# Patient Record
Sex: MALE | Race: WHITE | NOT HISPANIC OR LATINO
[De-identification: names, ages, dates, MRNs, and addresses within clinical notes are randomized per-mention and may not be internally consistent; named-entity substitution may affect disease eponyms.]

---

## 2020-01-07 ENCOUNTER — APPOINTMENT (OUTPATIENT)
Dept: CARDIOLOGY | Facility: CLINIC | Age: 30
End: 2020-01-07
Payer: COMMERCIAL

## 2020-01-07 ENCOUNTER — RECORD ABSTRACTING (OUTPATIENT)
Age: 30
End: 2020-01-07

## 2020-01-07 ENCOUNTER — NON-APPOINTMENT (OUTPATIENT)
Age: 30
End: 2020-01-07

## 2020-01-07 VITALS
BODY MASS INDEX: 32.85 KG/M2 | HEIGHT: 74 IN | HEART RATE: 87 BPM | WEIGHT: 256 LBS | SYSTOLIC BLOOD PRESSURE: 160 MMHG | DIASTOLIC BLOOD PRESSURE: 100 MMHG | OXYGEN SATURATION: 96 %

## 2020-01-07 DIAGNOSIS — Z78.9 OTHER SPECIFIED HEALTH STATUS: ICD-10-CM

## 2020-01-07 DIAGNOSIS — Z87.891 PERSONAL HISTORY OF NICOTINE DEPENDENCE: ICD-10-CM

## 2020-01-07 PROCEDURE — 93000 ELECTROCARDIOGRAM COMPLETE: CPT

## 2020-01-07 PROCEDURE — 99204 OFFICE O/P NEW MOD 45 MIN: CPT

## 2020-01-07 NOTE — REASON FOR VISIT
[Consultation] : a consultation regarding [Abnormal ECG] : an abnormal ECG [Heart Failure] : congestive heart failure [Hypertension] : hypertension

## 2020-01-07 NOTE — ASSESSMENT
[FreeTextEntry1] : History of heart failure, CMP\par Renal insufficiency\par Sleep apnea\par Uncontrolled hypertension, HHD. \par \par Restart carvedilol, hydralazine.\par Take Imdur once daily\par \par Echo\par Rule out pulmonary hypertension.\par Monitor blood pressure at home. \par Low-sodium diet. \par Weight loss for long-term cardiovascular health. \par Treat sleep apnea. \par Labs requested. \par \par Return to office in one week for blood pressure check. \par If symptoms ER inpatient evaluation. \par Records have been requested from [facility] cardiology.

## 2020-01-07 NOTE — ADDENDUM
[FreeTextEntry1] : Prior echocardiogram demonstrated LV dysfunction which improved with blood pressure control. \par Prior cardiac MRI demonstrated normal left ventricular function with mid to basal hypertrophy. \par Exercise test was negative for ischemia. \par Last echocardiogram by report describes normal left ventricular function. \par Prior blood work 2015, creatinine 1.7\par 2014 2.9 \par

## 2020-01-07 NOTE — PHYSICAL EXAM
[General Appearance - Well Developed] : well developed [Normal Appearance] : normal appearance [Well Groomed] : well groomed [General Appearance - Well Nourished] : well nourished [No Deformities] : no deformities [General Appearance - In No Acute Distress] : no acute distress [Normal Conjunctiva] : the conjunctiva exhibited no abnormalities [Eyelids - No Xanthelasma] : the eyelids demonstrated no xanthelasmas [Normal Oral Mucosa] : normal oral mucosa [No Oral Pallor] : no oral pallor [No Oral Cyanosis] : no oral cyanosis [Normal Jugular Venous A Waves Present] : normal jugular venous A waves present [Normal Jugular Venous V Waves Present] : normal jugular venous V waves present [No Jugular Venous Matthew A Waves] : no jugular venous matthew A waves [Heart Rate And Rhythm] : heart rate and rhythm were normal [Heart Sounds] : normal S1 and S2 [Murmurs] : no murmurs present [Respiration, Rhythm And Depth] : normal respiratory rhythm and effort [Exaggerated Use Of Accessory Muscles For Inspiration] : no accessory muscle use [Auscultation Breath Sounds / Voice Sounds] : lungs were clear to auscultation bilaterally [Abdomen Soft] : soft [Abdomen Tenderness] : non-tender [Abdomen Mass (___ Cm)] : no abdominal mass palpated [Abnormal Walk] : normal gait [Gait - Sufficient For Exercise Testing] : the gait was sufficient for exercise testing [Nail Clubbing] : no clubbing of the fingernails [Cyanosis, Localized] : no localized cyanosis [Petechial Hemorrhages (___cm)] : no petechial hemorrhages [Skin Color & Pigmentation] : normal skin color and pigmentation [] : no rash [No Venous Stasis] : no venous stasis [Skin Lesions] : no skin lesions [No Skin Ulcers] : no skin ulcer [No Xanthoma] : no  xanthoma was observed [Oriented To Time, Place, And Person] : oriented to person, place, and time [Affect] : the affect was normal [Mood] : the mood was normal [No Anxiety] : not feeling anxious [FreeTextEntry1] : OVERWEIGHT

## 2020-01-07 NOTE — HISTORY OF PRESENT ILLNESS
[FreeTextEntry1] : KATINA PARHAM  is a 29 year old  M\par Initial cardiovascular evaluation. \par \par History of heart failure, CMP, CRI, JUAN, past tob use. \par In 2014. He presented to an urgent care with cough and uncontrolled blood pressure. \par Transferred to the hospital. He reports he was initially admitted to the ICU.\par There was LV and renal dysfunction.  Told heart is enlarged. 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 \par Followed at [facility] cardiology.  Started on medical therapy. \par Previously saw nephrology. \par There is no prior history of a clinical myocardial infarction, coronary revascularization. \par There is no history of rheumatic heart disease or valvular disease.\par There is no history of symptomatic arrhythmias including atrial fibrillation.\par \par Recently he has been less active and been out of his medications. \par Previously, he was prescribed carvedilol, enalapril, hydralazine, and isosorbide. \par He has had no recent blood work performed. \par There is no exertional chest pain, pressure or discomfort. \par There is no significant dyspnea on exertion or orthopnea. \par There are no symptomatic palpitations, lightheadedness, dizziness or syncope.\par \par EKG demonstrates normal sinus rhythm LVH with nonspecific ST changes\par

## 2020-01-17 ENCOUNTER — APPOINTMENT (OUTPATIENT)
Dept: CARDIOLOGY | Facility: CLINIC | Age: 30
End: 2020-01-17
Payer: COMMERCIAL

## 2020-01-17 VITALS
SYSTOLIC BLOOD PRESSURE: 140 MMHG | WEIGHT: 252 LBS | OXYGEN SATURATION: 97 % | HEIGHT: 74 IN | DIASTOLIC BLOOD PRESSURE: 70 MMHG | HEART RATE: 79 BPM | BODY MASS INDEX: 32.34 KG/M2

## 2020-01-17 PROCEDURE — 99214 OFFICE O/P EST MOD 30 MIN: CPT

## 2020-01-17 RX ORDER — ENALAPRIL MALEATE 10 MG/1
10 TABLET ORAL DAILY
Refills: 0 | Status: DISCONTINUED | COMMUNITY
End: 2020-01-17

## 2020-01-17 NOTE — PHYSICAL EXAM
[Normal Appearance] : normal appearance [General Appearance - Well Developed] : well developed [Well Groomed] : well groomed [General Appearance - Well Nourished] : well nourished [General Appearance - In No Acute Distress] : no acute distress [No Deformities] : no deformities [Normal Conjunctiva] : the conjunctiva exhibited no abnormalities [Eyelids - No Xanthelasma] : the eyelids demonstrated no xanthelasmas [Normal Oral Mucosa] : normal oral mucosa [No Oral Cyanosis] : no oral cyanosis [No Oral Pallor] : no oral pallor [Normal Jugular Venous V Waves Present] : normal jugular venous V waves present [Normal Jugular Venous A Waves Present] : normal jugular venous A waves present [No Jugular Venous Matthew A Waves] : no jugular venous matthew A waves [Respiration, Rhythm And Depth] : normal respiratory rhythm and effort [Exaggerated Use Of Accessory Muscles For Inspiration] : no accessory muscle use [Auscultation Breath Sounds / Voice Sounds] : lungs were clear to auscultation bilaterally [Heart Rate And Rhythm] : heart rate and rhythm were normal [Heart Sounds] : normal S1 and S2 [Murmurs] : no murmurs present [Abdomen Tenderness] : non-tender [Abdomen Soft] : soft [Abdomen Mass (___ Cm)] : no abdominal mass palpated [Abnormal Walk] : normal gait [Gait - Sufficient For Exercise Testing] : the gait was sufficient for exercise testing [Nail Clubbing] : no clubbing of the fingernails [Cyanosis, Localized] : no localized cyanosis [Petechial Hemorrhages (___cm)] : no petechial hemorrhages [Skin Color & Pigmentation] : normal skin color and pigmentation [] : no rash [No Venous Stasis] : no venous stasis [No Skin Ulcers] : no skin ulcer [Skin Lesions] : no skin lesions [No Xanthoma] : no  xanthoma was observed [Oriented To Time, Place, And Person] : oriented to person, place, and time [Affect] : the affect was normal [Mood] : the mood was normal [No Anxiety] : not feeling anxious

## 2020-01-17 NOTE — ASSESSMENT
[FreeTextEntry1] : 29 year old male with the above PMH and following active issues:\par \par HTN: BP elevated. Asymptomatic. Increase Coreg to 25 mg BID. Increase Hydralazine to 50 mg TID. Continue Isosorbide Mononitrite.\par \par Echocardiogram scheduled for Monday, 1/20/20 to evaluate heart structure and function. Patient will have BP check Monday. If BP remains elevated this weekend or if symptoms should occur, advised to call 911 or present to the nearest ED.\par \par CRI: Advised ongoing follow up with Nephrology.\par \par Elevated trigs and lipid profile. Low chol diet advised. Limit sugars, carbs, etoh. Would reasses in 6 weeks.\par \par Discussed exertional symptoms such as headache, visual changes, nausea, vomiting, decreased exercise tolerance, chest discomfort, or shortness of breath which would warrant sooner evaluation/further investigation.\par \par He will follow up Monday, 1/20/20.\par \par Sincerely,\par Val MATA-BC\par Patient's history, testing, and plan was reviewed with supervising physician, Dr. Brett Nieves

## 2020-01-17 NOTE — HISTORY OF PRESENT ILLNESS
[FreeTextEntry1] : Donis Murphy is a 29 year old male with a PMH of abnormal EKG, CHF, HTN, heart failure, cardiomyopathy, CRI, JUAN, and past tobacco use.]\par \par In 2014 he presented to an urgent care with cough and uncontrolled BP. He was transferred to the hospital and admitted to the ICU. There was LV and renal dysfunction. Told his heart was enlarged. 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 Followed at [facility], started on medical therapy. Previously saw nephrology. Recently he has been less active and been out of his medications. Previously, he was prescribed carvedilol, enalapril, hydralazine, and isosorbide.\par \par Last seen 1/7/20. In the interim there have been no hospitalizations or procedures.\par On 1/7/20, he was restarted on his medications (Coreg and Hydralazine). Labs and echo were ordered. He presents today for BP check. \par \par BP on my exam right arm 208/140, left arm 200/120. Completely asymptomatic. Denies headache, visual disturbances, nausea, and vomiting. He denies chest pain, pressure, palpitations, unusual shortness of breath, orthopnea, LE edema, lightheadedness, dizziness, near syncope or syncope.\par \par \par Testing:\par \par Labs 1/14/20: Hgb 17.6, Hct 50.1, platelets 232, BUN 24, Cr 1.49, K 3.9, AST 27, ALT 48, Chol 240, Trig 302, HDL 42, LDl 138, Hgb A1C 5.3, TSH 2.420, C-Reactive Protein, Cardiac 1.02, , Mag 2.1\par \par EKG 1/7/20: demonstrates normal sinus rhythm LVH with nonspecific ST changes\par \par Echocardiogram 2/11/16: Normal LVSF. Normal LV cavity size. Normal LV wall thickness. EF 55-60%.\par \par ETT 10/27/14: Negative exercise EKG. No arrhythmias during exercise. Hypertensive response to exercise. Excellent exercise capacity at 13.1 mets.\par \par Echo 7/16/14: Normal LV size. Overall EF 15-20%. Mild mitral insufficiency. Concentric LVH. Dilated RV and LA.\par \par

## 2020-01-20 ENCOUNTER — APPOINTMENT (OUTPATIENT)
Dept: CARDIOLOGY | Facility: CLINIC | Age: 30
End: 2020-01-20
Payer: COMMERCIAL

## 2020-01-20 VITALS
DIASTOLIC BLOOD PRESSURE: 110 MMHG | HEART RATE: 67 BPM | WEIGHT: 252 LBS | OXYGEN SATURATION: 96 % | HEIGHT: 74 IN | BODY MASS INDEX: 32.34 KG/M2 | SYSTOLIC BLOOD PRESSURE: 158 MMHG

## 2020-01-20 PROCEDURE — ZZZZZ: CPT

## 2020-01-20 PROCEDURE — 99214 OFFICE O/P EST MOD 30 MIN: CPT

## 2020-01-20 PROCEDURE — 93306 TTE W/DOPPLER COMPLETE: CPT

## 2020-01-20 RX ORDER — CARVEDILOL 12.5 MG/1
12.5 TABLET, FILM COATED ORAL
Qty: 180 | Refills: 3 | Status: DISCONTINUED | COMMUNITY
End: 2020-01-20

## 2020-01-20 RX ORDER — HYDRALAZINE HYDROCHLORIDE 25 MG/1
25 TABLET ORAL
Qty: 270 | Refills: 0 | Status: DISCONTINUED | COMMUNITY
End: 2020-01-20

## 2020-01-20 NOTE — ADDENDUM
[FreeTextEntry1] : Please note the patient was reviewed with the PA.\par I was physically present during the service of the patient\par I was directly involved in the management plan and recommendations of care provided to the patient. \par I personally reviewed the history and physical exam and plan as documented by the PA above.\par \par Jake Nieves DO, FACC, RPVI\par Cardiologist\par 01/20/2020\par \par

## 2020-01-20 NOTE — ASSESSMENT
[FreeTextEntry1] : 29 year old male with the above PMH and following active issues:\par \par HTN: BP elevated. Asymptomatic. Start Nifedipine ER 30mg daily. Hypertension work up to include: Serum aldosterone, 24 hour catecholamines (urine), serum AM cortison, 24 hour urine Cr, plasma matanephrine, urine metanephine, plasma renin activity, TSH, and Vanillylmandelic acid urine. Will obtain renal ultrasound to r/u renal artery stenosis. Low salt diet discussed.\par \par Patient will have BP check next week. If BP remains elevated this weekend or if symptoms should occur, advised to call 911 or present to the nearest ED.\par \par JUAN: On CPAP.\par \par CRI: Advised ongoing follow up with Nephrology.\par \par Elevated trigs and lipid profile. Low chol diet advised. Limit sugars, carbs, etoh. Would reasses in 6 weeks.\par \par Discussed exertional symptoms such as headache, visual changes, nausea, vomiting, decreased exercise tolerance, chest discomfort, or shortness of breath which would warrant sooner evaluation/further investigation.\par \par He will follow up next week for BP check.\par \par Sincerely,\par Val Antony FNP-BC\par Patient's history, testing, and plan was reviewed with supervising physician, Dr. Brett Nieves

## 2020-01-20 NOTE — PHYSICAL EXAM
[General Appearance - Well Developed] : well developed [Normal Appearance] : normal appearance [General Appearance - Well Nourished] : well nourished [Well Groomed] : well groomed [No Deformities] : no deformities [General Appearance - In No Acute Distress] : no acute distress [Normal Conjunctiva] : the conjunctiva exhibited no abnormalities [Eyelids - No Xanthelasma] : the eyelids demonstrated no xanthelasmas [No Oral Pallor] : no oral pallor [Normal Oral Mucosa] : normal oral mucosa [No Oral Cyanosis] : no oral cyanosis [Normal Jugular Venous A Waves Present] : normal jugular venous A waves present [Normal Jugular Venous V Waves Present] : normal jugular venous V waves present [No Jugular Venous Matthew A Waves] : no jugular venous matthew A waves [Respiration, Rhythm And Depth] : normal respiratory rhythm and effort [Exaggerated Use Of Accessory Muscles For Inspiration] : no accessory muscle use [Heart Rate And Rhythm] : heart rate and rhythm were normal [Auscultation Breath Sounds / Voice Sounds] : lungs were clear to auscultation bilaterally [Heart Sounds] : normal S1 and S2 [Murmurs] : no murmurs present [Abdomen Soft] : soft [Abdomen Tenderness] : non-tender [Abdomen Mass (___ Cm)] : no abdominal mass palpated [Abnormal Walk] : normal gait [Nail Clubbing] : no clubbing of the fingernails [Gait - Sufficient For Exercise Testing] : the gait was sufficient for exercise testing [Petechial Hemorrhages (___cm)] : no petechial hemorrhages [Cyanosis, Localized] : no localized cyanosis [] : no rash [No Venous Stasis] : no venous stasis [Skin Color & Pigmentation] : normal skin color and pigmentation [No Skin Ulcers] : no skin ulcer [Skin Lesions] : no skin lesions [No Xanthoma] : no  xanthoma was observed [Oriented To Time, Place, And Person] : oriented to person, place, and time [Affect] : the affect was normal [Mood] : the mood was normal [No Anxiety] : not feeling anxious

## 2020-01-20 NOTE — HISTORY OF PRESENT ILLNESS
[FreeTextEntry1] : Donis Murphy is a 29 year old male with a PMH of abnormal EKG, CHF, HTN, heart failure, cardiomyopathy, CRI, JUAN, and past tobacco use.\par \par In 2014 he presented to an urgent care with cough and uncontrolled BP. He was transferred to the hospital and admitted to the ICU. There was LV and renal dysfunction. Told his heart was enlarged. 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 Followed at [facility], started on medical therapy. Previously saw nephrology. Recently he has been less active and been out of his medications. Previously, he was prescribed carvedilol, enalapril, hydralazine, and isosorbide.\par \par Last seen 1/17/20. BP was elevated. His Coreg was increased to 25 mg BID and Hydralazine was increased to 50mg TID and he presents today for repeat BP eval. /120 on my exam. Asymptomatic.\par He denies chest pain, pressure, palpitations, unusual shortness of breath, orthopnea, LE edema, lightheadedness, dizziness, near syncope or syncope.\par \par \par  In the interim there have been no hospitalizations or procedures.\par \par \par Echocardiogram done today demonstrating mild LVH, full report to follow.\par \par Testing:\par \par Labs 1/14/20: Hgb 17.6, Hct 50.1, platelets 232, BUN 24, Cr 1.49, K 3.9, AST 27, ALT 48, Chol 240, Trig 302, HDL 42, LDl 138, Hgb A1C 5.3, TSH 2.420, C-Reactive Protein, Cardiac 1.02, , Mag 2.1\par \par EKG 1/7/20: demonstrates normal sinus rhythm LVH with nonspecific ST changes\par \par Echocardiogram 2/11/16: Normal LVSF. Normal LV cavity size. Normal LV wall thickness. EF 55-60%.\par \par ETT 10/27/14: Negative exercise EKG. No arrhythmias during exercise. Hypertensive response to exercise. Excellent exercise capacity at 13.1 mets.\par \par Echo 7/16/14: Normal LV size. Overall EF 15-20%. Mild mitral insufficiency. Concentric LVH. Dilated RV and LA.\par \par

## 2020-06-19 ENCOUNTER — APPOINTMENT (OUTPATIENT)
Dept: ULTRASOUND IMAGING | Facility: CLINIC | Age: 30
End: 2020-06-19
Payer: COMMERCIAL

## 2020-06-19 PROCEDURE — 93975 VASCULAR STUDY: CPT

## 2020-06-23 ENCOUNTER — APPOINTMENT (OUTPATIENT)
Dept: CARDIOLOGY | Facility: CLINIC | Age: 30
End: 2020-06-23
Payer: COMMERCIAL

## 2020-06-23 VITALS
HEART RATE: 83 BPM | SYSTOLIC BLOOD PRESSURE: 130 MMHG | DIASTOLIC BLOOD PRESSURE: 84 MMHG | HEIGHT: 74 IN | WEIGHT: 268 LBS | OXYGEN SATURATION: 97 % | BODY MASS INDEX: 34.39 KG/M2

## 2020-06-23 VITALS — DIASTOLIC BLOOD PRESSURE: 106 MMHG | SYSTOLIC BLOOD PRESSURE: 150 MMHG

## 2020-06-23 PROCEDURE — 99214 OFFICE O/P EST MOD 30 MIN: CPT

## 2020-06-23 NOTE — PHYSICAL EXAM
[General Appearance - Well Developed] : well developed [Normal Appearance] : normal appearance [Well Groomed] : well groomed [General Appearance - Well Nourished] : well nourished [No Deformities] : no deformities [General Appearance - In No Acute Distress] : no acute distress [Normal Jugular Venous A Waves Present] : normal jugular venous A waves present [Normal Jugular Venous V Waves Present] : normal jugular venous V waves present [No Jugular Venous Matthew A Waves] : no jugular venous matthew A waves [Exaggerated Use Of Accessory Muscles For Inspiration] : no accessory muscle use [Respiration, Rhythm And Depth] : normal respiratory rhythm and effort [Auscultation Breath Sounds / Voice Sounds] : lungs were clear to auscultation bilaterally [Murmurs] : no murmurs present [Heart Rate And Rhythm] : heart rate and rhythm were normal [Heart Sounds] : normal S1 and S2 [Abdomen Tenderness] : non-tender [Abdomen Soft] : soft [Abdomen Mass (___ Cm)] : no abdominal mass palpated [Abnormal Walk] : normal gait [Gait - Sufficient For Exercise Testing] : the gait was sufficient for exercise testing [Nail Clubbing] : no clubbing of the fingernails [Cyanosis, Localized] : no localized cyanosis [Petechial Hemorrhages (___cm)] : no petechial hemorrhages [] : no rash [Skin Color & Pigmentation] : normal skin color and pigmentation [No Venous Stasis] : no venous stasis [Skin Lesions] : no skin lesions [No Skin Ulcers] : no skin ulcer [No Xanthoma] : no  xanthoma was observed [Oriented To Time, Place, And Person] : oriented to person, place, and time [Affect] : the affect was normal [No Anxiety] : not feeling anxious [Mood] : the mood was normal [FreeTextEntry1] : grossly normal

## 2020-06-23 NOTE — ASSESSMENT
[FreeTextEntry1] : 29 year old male with the above PMH and following active issues:\par \par HTN: BP elevated. Asymptomatic. 2/2 HTN workup thus far has been reviewed. CKD likely contributing. Last creat 1.49. Start low dose lisinopril/hctz 10-12.5mg daily. Close monitoring of renal function. Continue hydralazine, imdur, coreg, and nifedipine at current dosing. Sequential iterative approach to BP control. Long term would prefer less frequent dosing to ensure compliance. Low salt diet discussed. Increase exercise regimen, weight loss. \par \par JUAN: On CPAP.\par \par CKD: Advised ongoing follow up with Nephrology.\par \par HLD: Repeat lipid panel\par \par Patient remains asymptomatic. No clinical evidence of HF. Surveillance monitoring of echocardiography and close clinical followup for BP control. In the future consider referral to HTN specialist in [facility]. \par \par Sincerely,\par \par EVIN Cisneros\par Patients history, testing, and plan reviewed with supervising MD: Dr. Ed Villafana

## 2020-06-23 NOTE — ADDENDUM
[FreeTextEntry1] : Please note the patient was seen and examined with NP Elsie Matta\par I was physically present during the service of the patient and personally examined the patient. \par I was directly involved in the management plan and recommendations of the care provided to the patient. \par I personally reviewed the history and physical examination as documented by the NP above.\par 06/23/2020\par

## 2020-07-15 RX ORDER — LISINOPRIL AND HYDROCHLOROTHIAZIDE TABLETS 10; 12.5 MG/1; MG/1
10-12.5 TABLET ORAL
Qty: 60 | Refills: 0 | Status: DISCONTINUED | COMMUNITY
Start: 2020-06-23 | End: 2020-07-15

## 2020-08-11 ENCOUNTER — APPOINTMENT (OUTPATIENT)
Dept: CARDIOLOGY | Facility: CLINIC | Age: 30
End: 2020-08-11
Payer: COMMERCIAL

## 2020-08-11 VITALS
BODY MASS INDEX: 35.68 KG/M2 | WEIGHT: 278 LBS | SYSTOLIC BLOOD PRESSURE: 132 MMHG | HEIGHT: 74 IN | OXYGEN SATURATION: 95 % | HEART RATE: 97 BPM | DIASTOLIC BLOOD PRESSURE: 78 MMHG

## 2020-08-11 VITALS — DIASTOLIC BLOOD PRESSURE: 88 MMHG | SYSTOLIC BLOOD PRESSURE: 130 MMHG

## 2020-08-11 PROCEDURE — 99214 OFFICE O/P EST MOD 30 MIN: CPT

## 2020-08-11 RX ORDER — NIFEDIPINE 30 MG/1
30 TABLET, EXTENDED RELEASE ORAL DAILY
Qty: 90 | Refills: 3 | Status: DISCONTINUED | COMMUNITY
Start: 2020-01-23 | End: 2020-08-11

## 2020-08-11 NOTE — HISTORY OF PRESENT ILLNESS
[FreeTextEntry1] : Donis Murphy is a 29 year old male with a PMH of abnormal EKG, CHF, HTN, heart failure, cardiomyopathy, CRI, JUAN, and past tobacco use.\par \par In 2014 he presented to an urgent care with cough and uncontrolled BP. He was transferred to the hospital and admitted to the ICU. There was LV and renal dysfunction. Told his heart was enlarged. 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 Initially followed at [facility], started on medical therapy. Previously saw nephrology. Previously, he was prescribed carvedilol, enalapril, hydralazine, and isosorbide.\par \par He is less active since pandemic. No longer attends gym but generally active at work. He denies chest pain, pressure, palpitations, unusual shortness of breath, orthopnea, LE edema, lightheadedness, dizziness, near syncope or syncope. In the interim there have been no hospitalizations or procedures.\par \par Pt continues to have elevated BPs. Prior visit was 150/100s. Given pt's creatinine improved, lisinopril/hctz was started. Subsequently, K mayte to 5.7, creat to 2.18 so med was stopped. Today on my exam BP is 130/94. \par \par Trigs were >500 and unable to calc LDL so lipitor increased to 40mg daily. \par \par Pt drinks alcohol only on the weekends and avoids salt. \par \par Echocardiogram demonstrating mild LVH, LVEF 55-60%, JEMMA of MV\par Labs 3/2020 renin, metanephrines, TSH WNL --- Called [facility] and 24h urine specimen unavailable?\par Renal US 6/2020 negative for [id number]\par \par Labs 1/14/20: Hgb 17.6, Hct 50.1, platelets 232, BUN 24, Cr 1.49, K 3.9, AST 27, ALT 48, Chol 240, Trig 302, HDL 42, LDl 138, Hgb A1C 5.3, TSH 2.420, C-Reactive Protein, Cardiac 1.02, , Mag 2.1\par \par EKG 1/7/20: demonstrates normal sinus rhythm LVH with nonspecific ST changes\par \par Echocardiogram 2/11/16: Normal LVSF. Normal LV cavity size. Normal LV wall thickness. EF 55-60%.\par \par ETT 10/27/14: Negative exercise EKG. No arrhythmias during exercise. Hypertensive response to exercise. Excellent exercise capacity at 13.1 mets.\par \par Echo 7/16/14: Normal LV size. Overall EF 15-20%. Mild mitral insufficiency. Concentric LVH. Dilated RV and LA.\par \par

## 2020-08-11 NOTE — ASSESSMENT
[FreeTextEntry1] : 29 year old male with the above PMH and following active issues:\par \par HTN: BP elevated. Asymptomatic. 2/2 HTN workup thus far has been reviewed. CKD likely contributing. Last creat 2.12 on ACE/diuretic. Would avoid nephrotoxic agents and recommend pt have nephrology referral asap. Close monitoring of renal function. Continue hydralazine, imdur, coreg at current dosing. Seeing as HR >90 and DBP remains elevated, pt would like benefit from diltiazem over nifedipine. Will stop nifedipine and start diltiazem 180mg daily. Sequential iterative approach to BP control. Long term would prefer less frequent dosing to ensure compliance. Low salt diet discussed. Increase exercise regimen, weight loss. \par \par JUAN: On CPAP.\par \par CKD: Advised ongoing follow up with Nephrology. Pt has OV with Dr. Feng late Sept. \par \par HLD: Repeat lipid panel pending after increase in lipitor. Note trigs > 500, counseled pt on reduction in ETOH/carbs/sugar. Pt given info for nutritionist as per his request. \par \par Patient remains asymptomatic. No clinical evidence of HF. Surveillance monitoring of echocardiography and close clinical followup for BP control. In the future consider referral to HTN specialist in [facility]. \par \par F/U in one month to monitor BP/HR. Pt will start home log and bring cuff to next visit. \par \par Sincerely,\par \par EVIN Cisneros\par Patients history, testing, and plan reviewed with supervising MD: Dr. Cole Vargas

## 2020-08-11 NOTE — PHYSICAL EXAM
[General Appearance - Well Developed] : well developed [Well Groomed] : well groomed [Normal Appearance] : normal appearance [General Appearance - Well Nourished] : well nourished [General Appearance - In No Acute Distress] : no acute distress [No Deformities] : no deformities [Normal Jugular Venous A Waves Present] : normal jugular venous A waves present [Normal Jugular Venous V Waves Present] : normal jugular venous V waves present [No Jugular Venous Matthew A Waves] : no jugular venous matthew A waves [Auscultation Breath Sounds / Voice Sounds] : lungs were clear to auscultation bilaterally [Exaggerated Use Of Accessory Muscles For Inspiration] : no accessory muscle use [Respiration, Rhythm And Depth] : normal respiratory rhythm and effort [Heart Sounds] : normal S1 and S2 [Heart Rate And Rhythm] : heart rate and rhythm were normal [Murmurs] : no murmurs present [Abdomen Tenderness] : non-tender [Abdomen Mass (___ Cm)] : no abdominal mass palpated [Abdomen Soft] : soft [Abnormal Walk] : normal gait [Gait - Sufficient For Exercise Testing] : the gait was sufficient for exercise testing [Nail Clubbing] : no clubbing of the fingernails [Petechial Hemorrhages (___cm)] : no petechial hemorrhages [Cyanosis, Localized] : no localized cyanosis [No Venous Stasis] : no venous stasis [] : no rash [Skin Color & Pigmentation] : normal skin color and pigmentation [No Skin Ulcers] : no skin ulcer [Skin Lesions] : no skin lesions [Affect] : the affect was normal [Oriented To Time, Place, And Person] : oriented to person, place, and time [No Xanthoma] : no  xanthoma was observed [No Anxiety] : not feeling anxious [Mood] : the mood was normal [FreeTextEntry1] : grossly normal

## 2020-09-15 ENCOUNTER — APPOINTMENT (OUTPATIENT)
Dept: CARDIOLOGY | Facility: CLINIC | Age: 30
End: 2020-09-15

## 2020-10-27 ENCOUNTER — APPOINTMENT (OUTPATIENT)
Dept: CARDIOLOGY | Facility: CLINIC | Age: 30
End: 2020-10-27

## 2020-12-01 ENCOUNTER — APPOINTMENT (OUTPATIENT)
Dept: CARDIOLOGY | Facility: CLINIC | Age: 30
End: 2020-12-01
Payer: COMMERCIAL

## 2020-12-01 ENCOUNTER — NON-APPOINTMENT (OUTPATIENT)
Age: 30
End: 2020-12-01

## 2020-12-01 VITALS
TEMPERATURE: 97.1 F | HEART RATE: 73 BPM | SYSTOLIC BLOOD PRESSURE: 162 MMHG | DIASTOLIC BLOOD PRESSURE: 102 MMHG | HEIGHT: 74 IN | OXYGEN SATURATION: 97 % | BODY MASS INDEX: 34.39 KG/M2 | WEIGHT: 268 LBS

## 2020-12-01 PROCEDURE — 99072 ADDL SUPL MATRL&STAF TM PHE: CPT

## 2020-12-01 PROCEDURE — 99214 OFFICE O/P EST MOD 30 MIN: CPT

## 2020-12-01 PROCEDURE — 93000 ELECTROCARDIOGRAM COMPLETE: CPT

## 2020-12-01 RX ORDER — ISOSORBIDE MONONITRATE 60 MG/1
60 TABLET, EXTENDED RELEASE ORAL DAILY
Qty: 90 | Refills: 3 | Status: DISCONTINUED | COMMUNITY
Start: 1900-01-01 | End: 2020-12-01

## 2020-12-01 NOTE — ADDENDUM
[FreeTextEntry1] : Please note the patient was seen and examined with NP Val Antony.\par I was physically present during the service of the patient and personally examined the patient. \par I was directly involved in the management plan and recommendations of the care provided to the patient. \par I personally reviewed the history and physical examination as documented by the NP above.\par 12/01/2020\par

## 2020-12-01 NOTE — PHYSICAL EXAM
[General Appearance - Well Developed] : well developed [Normal Appearance] : normal appearance [Well Groomed] : well groomed [General Appearance - Well Nourished] : well nourished [No Deformities] : no deformities [General Appearance - In No Acute Distress] : no acute distress [Normal Jugular Venous A Waves Present] : normal jugular venous A waves present [Normal Jugular Venous V Waves Present] : normal jugular venous V waves present [No Jugular Venous Matthew A Waves] : no jugular venous matthew A waves [Respiration, Rhythm And Depth] : normal respiratory rhythm and effort [Exaggerated Use Of Accessory Muscles For Inspiration] : no accessory muscle use [Auscultation Breath Sounds / Voice Sounds] : lungs were clear to auscultation bilaterally [Heart Rate And Rhythm] : heart rate and rhythm were normal [Heart Sounds] : normal S1 and S2 [Murmurs] : no murmurs present [Abdomen Soft] : soft [Abdomen Tenderness] : non-tender [Abdomen Mass (___ Cm)] : no abdominal mass palpated [Abnormal Walk] : normal gait [Gait - Sufficient For Exercise Testing] : the gait was sufficient for exercise testing [Nail Clubbing] : no clubbing of the fingernails [Cyanosis, Localized] : no localized cyanosis [Petechial Hemorrhages (___cm)] : no petechial hemorrhages [Skin Color & Pigmentation] : normal skin color and pigmentation [] : no rash [No Venous Stasis] : no venous stasis [Skin Lesions] : no skin lesions [No Skin Ulcers] : no skin ulcer [No Xanthoma] : no  xanthoma was observed [Oriented To Time, Place, And Person] : oriented to person, place, and time [Affect] : the affect was normal [Mood] : the mood was normal [No Anxiety] : not feeling anxious [FreeTextEntry1] : grossly normal

## 2020-12-01 NOTE — HISTORY OF PRESENT ILLNESS
[FreeTextEntry1] : Donis Murphy is a 30 year old male with a PMH of abnormal EKG, HTN, heart failure, cardiomyopathy, CRI, JUAN, and past tobacco use.\par \par In 2014 he presented to an urgent care with cough and uncontrolled BP. He was transferred to the hospital and admitted to the ICU. There was LV and renal dysfunction. Told his heart was enlarged. 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 Initially followed at [facility], started on medical therapy. Previously saw nephrology. Previously, he was prescribed carvedilol, enalapril, hydralazine, and isosorbide.\par \par Previously, given pt's creatinine improved, lisinopril/hctz was started. Subsequently, K mayte to 5.7, creat to 2.18 so med was stopped. Last seen 8/11/20. In the interim there have been no hospitalizations or procedures.  He has an upcoming apt to see nephrologist, Dr. Feng. He denies chest pain, pressure, palpitations, unusual shortness of breath, orthopnea, LE edema, lightheadedness, dizziness, near syncope or syncope. Exercising 6x a week without exertional complaints. Former smoker; quit ~4-5 years ago.\par \par /110 on my exam. Denies  headaches.\par \par Trigs were >500 and unable to calc LDL so lipitor increased to 40mg daily. Tolerating it well.\par \par Pt drinks alcohol only on the weekends and avoids salt. \par \par Testing:\par \par EKG 12/1/20: SR at 70 bpm, MI interval 144 ms, QTc 379 ms, nonspecific ST-T wave abnormalities \par \par Labs 11/25/20: K 3.9, Cr 1.48, Chol 139, HDL 32, LDL 38, Trigs 344, TSH 2.38, Hgb 17\par \par Echocardiogram demonstrating mild LVH, LVEF 55-60%, JEMMA of MV\par \par Labs 3/2020 renin, metanephrines, TSH WNL --- Called [facility] and 24h urine specimen unavailable?\par \par Renal US 6/2020 negative for СВЕТЛАНА\par \par Labs 1/14/20: Hgb 17.6, Hct 50.1, platelets 232, BUN 24, Cr 1.49, K 3.9, AST 27, ALT 48, Chol 240, Trig 302, HDL 42, LDl 138, Hgb A1C 5.3, TSH 2.420, C-Reactive Protein, Cardiac 1.02, , Mag 2.1\par \par EKG 1/7/20: demonstrates normal sinus rhythm LVH with nonspecific ST changes\par \par Echocardiogram 2/11/16: Normal LVSF. Normal LV cavity size. Normal LV wall thickness. EF 55-60%.\par \par ETT 10/27/14: Negative exercise EKG. No arrhythmias during exercise. Hypertensive response to exercise. Excellent exercise capacity at 13.1 mets.\par \par Echo 7/16/14: Normal LV size. Overall EF 15-20%. Mild mitral insufficiency. Concentric LVH. Dilated RV and LA.\par \par

## 2020-12-01 NOTE — ASSESSMENT
[FreeTextEntry1] : KATINA PARHAM is a 30 year old M who presents today Dec 01, 2020 with the above history and the following active issues:\par \par \par HTN: BP elevated. Asymptomatic. 2/2 HTN workup thus far has been reviewed. CKD likely contributing. Cr stable at 1.48 and BP uncontrolled. Start Losartan 25mg daily. BMP in 1-2 weeks. Close monitoring of renal function. Continue hydralazine, coreg, and Dilt at current dosing. Sequential iterative approach to BP control. Long term would prefer less frequent dosing to ensure compliance. Low salt diet discussed. Increase exercise regimen, weight loss. Referral to HTN specialist, Dr. Juan Martinez given to patient.\par \par JUAN: On CPAP.\par \par CKD: Advised ongoing follow up with Nephrology. Pt has OV with Dr. Feng late Sept. \par \par HLD: Chol better controlled on statin. Trigs still elevated. Decrease ETOH/carbs/sugar. \par \par Patient remains asymptomatic. No clinical evidence of HF. Surveillance monitoring of echocardiography and close clinical followup for BP control. Echo has been ordered.\par \par Ongoing f/u with PCP.\par \par F/U after testing to review results (echo, labs, BP check on Losartan).\par Discussed red flag symptoms, which would warrant sooner or emergent medical evaluation.\par Any questions and concerns were addressed and resolved.\par \par Sincerely,\par Val Antony FN-BC\par Patient's history, testing, and plan was reviewed with supervising physician, Dr. Ed Villafana

## 2020-12-18 ENCOUNTER — APPOINTMENT (OUTPATIENT)
Dept: CARDIOLOGY | Facility: CLINIC | Age: 30
End: 2020-12-18

## 2021-01-15 ENCOUNTER — APPOINTMENT (OUTPATIENT)
Dept: CARDIOLOGY | Facility: CLINIC | Age: 31
End: 2021-01-15
Payer: COMMERCIAL

## 2021-01-15 PROCEDURE — 93306 TTE W/DOPPLER COMPLETE: CPT

## 2021-01-15 PROCEDURE — 99072 ADDL SUPL MATRL&STAF TM PHE: CPT

## 2021-02-05 ENCOUNTER — APPOINTMENT (OUTPATIENT)
Dept: CARDIOLOGY | Facility: CLINIC | Age: 31
End: 2021-02-05

## 2021-02-15 ENCOUNTER — APPOINTMENT (OUTPATIENT)
Dept: CARDIOLOGY | Facility: CLINIC | Age: 31
End: 2021-02-15

## 2021-03-01 ENCOUNTER — RESULT CHARGE (OUTPATIENT)
Age: 31
End: 2021-03-01

## 2021-03-02 ENCOUNTER — APPOINTMENT (OUTPATIENT)
Dept: CARDIOLOGY | Facility: CLINIC | Age: 31
End: 2021-03-02
Payer: COMMERCIAL

## 2021-03-02 VITALS
BODY MASS INDEX: 34.65 KG/M2 | WEIGHT: 270 LBS | OXYGEN SATURATION: 96 % | SYSTOLIC BLOOD PRESSURE: 158 MMHG | DIASTOLIC BLOOD PRESSURE: 70 MMHG | HEART RATE: 77 BPM | HEIGHT: 74 IN

## 2021-03-02 PROCEDURE — 99072 ADDL SUPL MATRL&STAF TM PHE: CPT

## 2021-03-02 PROCEDURE — 99214 OFFICE O/P EST MOD 30 MIN: CPT

## 2021-03-02 NOTE — ASSESSMENT
[FreeTextEntry1] : KATINA PARHAM is a 30 year old M who presents today Mar 02, 2021 with the above history and the following active issues:\par \par \par HTN: BP elevated. Asymptomatic. 2/2 HTN workup thus far has been reviewed. CKD likely contributing. Cr stable at 1.68 and BP uncontrolled. Increase Losartan 25mg BID. Increase Diltiazem 180mg BID. Fasting b/w in 1 week and patient will come back to office in 2 weeks for BP check with home cuff. States BPs 140's-150's over 80's-90's at home. Close monitoring of renal function. Continue hydralazine and coreg at current dosing. Sequential iterative approach to BP control. Long term would prefer less frequent dosing to ensure compliance. Low salt diet discussed. Increase exercise regimen, weight loss. Referral to HTN specialist, Dr. Juan Martinez given to patient. He has apt 5/2021. Also has upcoming apt with renal, Dr. Feng.\par \par JUAN: On CPAP.\par \par CKD: Advised ongoing follow up with Nephrology. Pt has OV with Dr. Feng.\par \par HLD: Chol better controlled on statin. Trigs still elevated. Decrease ETOH/carbs/sugar. Fasting b/w ordered today.\par \par Patient remains asymptomatic. No clinical evidence of HF. Surveillance monitoring of echocardiography and close clinical followup for BP control.\par \par Ongoing f/u with PCP.\par \par F/U: Close clinical follow up. Patient to be seen in 2 weeks for BP check, review labs, and evaluate home cuff.\par Discussed red flag symptoms, which would warrant sooner or emergent medical evaluation.\par Any questions and concerns were addressed and resolved.\par \par Sincerely,\par Val Antony FNP-BC\par Patient's history, testing, and plan was reviewed with supervising physician, Dr. Ed Villafana

## 2021-03-02 NOTE — HISTORY OF PRESENT ILLNESS
[FreeTextEntry1] : Donis Murphy is a 30 year old male with a PMH of abnormal EKG, HTN, heart failure, cardiomyopathy, CRI, JUAN on CPAP, and past tobacco use.\par \par In 2014 he presented to an urgent care with cough and uncontrolled BP. He was transferred to the hospital and admitted to the ICU. There was LV and renal dysfunction. Told his heart was enlarged. 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 Initially followed at [facility], started on medical therapy. Previously saw nephrology. Previously, he was prescribed carvedilol, enalapril, hydralazine, and isosorbide.\par \par Previously, given pt's creatinine improved, lisinopril/hctz was started. Subsequently, K mayte to 5.7, creat to 2.18 so med was stopped. \par \par Last seen 12/1/20. Losartan was restarted. BMP ordered. Patient was also to see Dr. Feng (renal) and Dr. Juan Martinez (HTN specialist). He denies chest pain, pressure, palpitations, unusual shortness of breath, orthopnea, LE edema, lightheadedness, dizziness, near syncope or syncope. In the interim there have been no hospitalizations or procedures.  He has an upcoming apt to see nephrologist, Dr. Feng. Exercising 6x a week without exertional complaints. Former smoker; quit ~4-5 years ago.\par \par \par /118 on my exam. Asymptomatic.\par \par \par Testing:\par \par Labs 2/26/21: Gluc 120, Na 139, K 4, Cr 1.68, Ca 9.4\par \par Echo 1/15/21: EF 60%. Mild MR. Ascending aorta 3.8 cm (borderline dilation). Mild LVH. Normal wall motion. Minimal TR. Minimal [place]. Compared with echo 1/20/20, increase in LVH.\par \par EKG 12/1/20: SR at 70 bpm, OH interval 144 ms, QTc 379 ms, nonspecific ST-T wave abnormalities \par \par Labs 11/25/20: K 3.9, Cr 1.48, Chol 139, HDL 32, LDL 38, Trigs 344, TSH 2.38, Hgb 17\par \par Echocardiogram demonstrating mild LVH, LVEF 55-60%, JEMMA of MV\par \par Labs 3/2020 renin, metanephrines, TSH WNL --- Called [facility] and 24h urine specimen unavailable?\par \par Renal US 6/2020 negative for СВЕТЛАНА\par \par Labs 1/14/20: Hgb 17.6, Hct 50.1, platelets 232, BUN 24, Cr 1.49, K 3.9, AST 27, ALT 48, Chol 240, Trig 302, HDL 42, LDl 138, Hgb A1C 5.3, TSH 2.420, C-Reactive Protein, Cardiac 1.02, , Mag 2.1\par \par EKG 1/7/20: demonstrates normal sinus rhythm LVH with nonspecific ST changes\par \par Echocardiogram 2/11/16: Normal LVSF. Normal LV cavity size. Normal LV wall thickness. EF 55-60%.\par \par ETT 10/27/14: Negative exercise EKG. No arrhythmias during exercise. Hypertensive response to exercise. Excellent exercise capacity at 13.1 mets.\par \par Echo 7/16/14: Normal LV size. Overall EF 15-20%. Mild mitral insufficiency. Concentric LVH. Dilated RV and LA.\par \par

## 2021-03-02 NOTE — ADDENDUM
[FreeTextEntry1] : Please note the patient was seen and examined with NP aVl Antony.\maureen I was physically present during the service of the patient and personally examined the patient. \maureen I was directly involved in the management plan and recommendations of the care provided to the patient. \maureen I personally reviewed the history and physical examination as documented by the NP above.\par 03/02/2021\par

## 2021-03-10 ENCOUNTER — NON-APPOINTMENT (OUTPATIENT)
Age: 31
End: 2021-03-10

## 2021-03-16 ENCOUNTER — APPOINTMENT (OUTPATIENT)
Dept: CARDIOLOGY | Facility: CLINIC | Age: 31
End: 2021-03-16
Payer: COMMERCIAL

## 2021-03-16 VITALS
BODY MASS INDEX: 35.04 KG/M2 | WEIGHT: 273 LBS | OXYGEN SATURATION: 99 % | DIASTOLIC BLOOD PRESSURE: 74 MMHG | HEIGHT: 74 IN | SYSTOLIC BLOOD PRESSURE: 158 MMHG | HEART RATE: 80 BPM

## 2021-03-16 PROCEDURE — 99072 ADDL SUPL MATRL&STAF TM PHE: CPT

## 2021-03-16 PROCEDURE — 99214 OFFICE O/P EST MOD 30 MIN: CPT

## 2021-03-16 NOTE — ADDENDUM
[FreeTextEntry1] : Please note the patient was seen and examined with NP Val Antony.\maureen I was physically present during the service of the patient and personally examined the patient. \maureen I was directly involved in the management plan and recommendations of the care provided to the patient. \maureen I personally reviewed the history and physical examination as documented by the NP above.\par 03/16/2021\par

## 2021-03-16 NOTE — HISTORY OF PRESENT ILLNESS
[FreeTextEntry1] : Donis Murphy is a 30 year old male with a PMH of abnormal EKG, HTN, heart failure, cardiomyopathy, CRI, JUAN on CPAP, and past tobacco use.\par \par In 2014 he presented to an urgent care with cough and uncontrolled BP. He was transferred to the hospital and admitted to the ICU. There was LV and renal dysfunction. Told his heart was enlarged. 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 Initially followed at [facility], started on medical therapy. Previously saw nephrology. Previously, he was prescribed carvedilol, enalapril, hydralazine, and isosorbide.\par \par Previously, given pt's creatinine improved, lisinopril/hctz was started. Subsequently, K mayte to 5.7, creat to 2.18 so med was stopped. \par \par \par \par Last seen 3/2/21. Losartan 25mg BID was increased to BID. Dilt was increased to 100mg BID. Labs were ordered. See full list below. In the interim there have been no hospitalizations or procedures. He denies chest pain, pressure, palpitations, unusual shortness of breath, orthopnea, LE edema, lightheadedness, dizziness, near syncope or syncope. He has an upcoming apt to see nephrologist, Dr. Feng. Exercising 6x a week without exertional complaints. Former smoker; quit ~4-5 years ago.\par \par /120 in right arm, and 168/120 in left arm. Asymptomatic. States SBPs are 140's-150's at home.\par \par Of note, he received his Covid vaccine today.\par \par \par Testing:\par \par Labs 3/15/21: WBC 6.1, Hgb 15.9, HCT 45.5, plt 231, Gluc 106, Cr 1.74Na 140, K 4, Ca 9.6, AST 26, ALT 44, Chol 212, Trigs 583, HDL 31, LDL 86, A1C 5.4, TSH 2.080, CRP 0.81, BNP 13, , Mag 2.2. Patient states he had not exercised 1 week prior to lab draw.\par \par Labs 2/26/21: Gluc 120, Na 139, K 4, Cr 1.68, Ca 9.4\par \par Echo 1/15/21: EF 60%. Mild MR. Ascending aorta 3.8 cm (borderline dilation). Mild LVH. Normal wall motion. Minimal TR. Minimal [place]. Compared with echo 1/20/20, increase in LVH.\par \par EKG 12/1/20: SR at 70 bpm, UT interval 144 ms, QTc 379 ms, nonspecific ST-T wave abnormalities \par \par Labs 11/25/20: K 3.9, Cr 1.48, Chol 139, HDL 32, LDL 38, Trigs 344, TSH 2.38, Hgb 17\par \par Echocardiogram demonstrating mild LVH, LVEF 55-60%, JEMMA of MV\par \par Labs 3/2020 renin, metanephrines, TSH WNL --- Called [facility] and 24h urine specimen unavailable?\par \par Renal US 6/2020 negative for СВЕТЛАНА\par \par Labs 1/14/20: Hgb 17.6, Hct 50.1, platelets 232, BUN 24, Cr 1.49, K 3.9, AST 27, ALT 48, Chol 240, Trig 302, HDL 42, LDl 138, Hgb A1C 5.3, TSH 2.420, C-Reactive Protein, Cardiac 1.02, , Mag 2.1\par \par EKG 1/7/20: demonstrates normal sinus rhythm LVH with nonspecific ST changes\par \par Echocardiogram 2/11/16: Normal LVSF. Normal LV cavity size. Normal LV wall thickness. EF 55-60%.\par \par ETT 10/27/14: Negative exercise EKG. No arrhythmias during exercise. Hypertensive response to exercise. Excellent exercise capacity at 13.1 mets.\par \par Echo 7/16/14: Normal LV size. Overall EF 15-20%. Mild mitral insufficiency. Concentric LVH. Dilated RV and LA.\par \par

## 2021-03-16 NOTE — ASSESSMENT
[FreeTextEntry1] : KATINA PARHAM is a 30 year old M who presents today Mar 16, 2021 with the above history and the following active issues:\par \par HTN: BP elevated. Asymptomatic. 2/2 HTN workup thus far has been reviewed. CKD likely contributing. Cr stable at 1.74 and BP uncontrolled. Continue Losartan 25mg BID. Continue Diltiazem 180mg BID. States BPs 140's-150's over 80's-90's at home. Close monitoring of renal function. Continue hydralazine and coreg at current dosing. Start Spironolactone 12.5 mg daily. Fasting b/w in 1 week and patient will come back to office in 2 weeks for BP check with home cuff. Sequential iterative approach to BP control. Long term would prefer less frequent dosing to ensure compliance. Low salt diet discussed. Increase exercise regimen, weight loss. Referral to HTN specialist, Dr. Juan Martinez given to patient. He has apt 5/2021. Also has upcoming apt with renal, Dr. Feng.\par \par JUAN: On CPAP.\par \par CKD: Advised ongoing follow up with Nephrology. Pt has upcoming. OV with Dr. Feng.\par \par HLD: Chol better controlled on statin. Trigs still elevated. Decrease ETOH/carbs/sugar. Lovaza started. CK elevated, asymptomatic. Will recheck with next set of labs in 1 week. Advised to not exercise 48 hours prior.\par \par Patient remains asymptomatic. No clinical evidence of HF. Surveillance monitoring of echocardiography and close clinical followup for BP control.\par \par Ongoing f/u with PCP.\par \par F/U: Close clinical follow up. Patient to be seen in 2 weeks for BP check, review labs, and evaluate home cuff.\par Discussed red flag symptoms, which would warrant sooner or emergent medical evaluation.\par Any questions and concerns were addressed and resolved.\par \par Sincerely,\par Val Antony [facility]-BC\par Patient's history, testing, and plan was reviewed with supervising physician, Dr. Ed Villafana

## 2021-04-12 ENCOUNTER — APPOINTMENT (OUTPATIENT)
Dept: CARDIOLOGY | Facility: CLINIC | Age: 31
End: 2021-04-12
Payer: COMMERCIAL

## 2021-04-12 VITALS
TEMPERATURE: 96.8 F | WEIGHT: 284 LBS | OXYGEN SATURATION: 96 % | HEIGHT: 74 IN | DIASTOLIC BLOOD PRESSURE: 62 MMHG | BODY MASS INDEX: 36.45 KG/M2 | HEART RATE: 83 BPM | SYSTOLIC BLOOD PRESSURE: 124 MMHG

## 2021-04-12 PROCEDURE — 99072 ADDL SUPL MATRL&STAF TM PHE: CPT

## 2021-04-12 PROCEDURE — 99214 OFFICE O/P EST MOD 30 MIN: CPT

## 2021-04-12 NOTE — ASSESSMENT
[FreeTextEntry1] : KATINA PARHAM is a 30 year old M who presents today Apr 12, 2021 with the above history and the following active issues:\par \par \par HTN: BP elevated. Asymptomatic. 2/2 HTN workup thus far has been reviewed. CKD likely contributing. Cr stable at 1.47. Continue Losartan 25mg BID. Continue Diltiazem 180mg BID. Continue hydralazine and coreg at current dosing. Continue Spironolactone 12.5 mg daily. Close monitoring of renal function.  Sequential iterative approach to BP control. Long term would prefer less frequent dosing to ensure compliance. Low salt diet discussed. Increase exercise regimen, weight loss. Referral to HTN specialist, Dr. Juan Martinez given to patient. He has apt 5/2021. Also has upcoming apt with renal, Dr. Feng.\par \par JUAN: On CPAP.\par \par CKD: Advised ongoing follow up with Nephrology. Pt has upcoming. OV with Dr. Feng.\par \par HLD: Chol better controlled on statin. Trigs still elevated. Decrease ETOH/carbs/sugar. Continue Lovaza.\par \par CRP elevated. Referral to rheumatologist has been given.\par \par Patient remains asymptomatic. No clinical evidence of HF. Surveillance monitoring of echocardiography and close clinical followup for BP control.\par \par Ongoing f/u with PCP.\par \par F/U in 2 months with Dr. Villafana.\par Discussed red flag symptoms, which would warrant sooner or emergent medical evaluation.\par Any questions and concerns were addressed and resolved.\par \par Sincerely,\par Val Antony [facility]-BC\par Patient's history, testing, and plan was reviewed with supervising physician, Dr. Jake Nieves

## 2021-04-12 NOTE — HISTORY OF PRESENT ILLNESS
[FreeTextEntry1] : Donis Murpyh is a 30 year old male with a PMH of abnormal EKG, HTN, heart failure, cardiomyopathy, CRI, JUAN on CPAP, and past tobacco use.\par \par In 2014 he presented to an urgent care with cough and uncontrolled BP. He was transferred to the hospital and admitted to the ICU. There was LV and renal dysfunction. Told his heart was enlarged. 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 Initially followed at [facility], started on medical therapy. Previously saw nephrology. Previously, he was prescribed carvedilol, enalapril, hydralazine, and isosorbide.\par \par Previously, given pt's creatinine improved, lisinopril/hctz was started. Subsequently, K mayte to 5.7, creat to 2.18 so med was stopped. \par \par \par Last seen 3/16/21. BP was elevated. Spironolactone 12.5mg daily started. He was also advised to STOP a work out supplement he was taking from [employer]. Labs ordered. Presents today, 4/12/21, for BP check. BP is well controlled today. 120/70 on my exam. In the interim there have been no hospitalizations or procedures. He denies chest pain, pressure, palpitations, unusual shortness of breath, orthopnea, LE edema, lightheadedness, dizziness, near syncope or syncope. Exercising 6x a week without exertional complaints. Former smoker; quit ~4-5 years ago.\par \par He has an upcoming apt to see nephrologist, Dr. Feng. He also has an apt with HTN specialist Dr. Juan Martinez in May.\par \par He has received both Covid vaccines.\par \par \par Testing:\par \par Labs 4/9/21: WBC 6.1, Hgb 15.8, HCT 45.8, plt 209, Gluv 82, BUN 22, Cr 1.47, Na 141, k 4.3, Ca 9.7, AST 33, ALT 66, Chol 231, Trigs 470, HDL 37, , A1C 5.6, TSH 1.840, CRP 7.68, BNP 15, , Mag 2.2.\par \par Labs 3/15/21: WBC 6.1, Hgb 15.9, HCT 45.5, plt 231, Gluc 106, Cr 1.74Na 140, K 4, Ca 9.6, AST 26, ALT 44, Chol 212, Trigs 583, HDL 31, LDL 86, A1C 5.4, TSH 2.080, CRP 0.81, BNP 13, , Mag 2.2. Patient states he had not exercised 1 week prior to lab draw.\par \par Labs 2/26/21: Gluc 120, Na 139, K 4, Cr 1.68, Ca 9.4\par \par Echo 1/15/21: EF 60%. Mild MR. Ascending aorta 3.8 cm (borderline dilation). Mild LVH. Normal wall motion. Minimal TR. Minimal [place]. Compared with echo 1/20/20, increase in LVH.\par \par EKG 12/1/20: SR at 70 bpm, [place] interval 144 ms, QTc 379 ms, nonspecific ST-T wave abnormalities \par \par Labs 11/25/20: K 3.9, Cr 1.48, Chol 139, HDL 32, LDL 38, Trigs 344, TSH 2.38, Hgb 17\par \par Echocardiogram demonstrating mild LVH, LVEF 55-60%, JEMMA of MV\par \par Labs 3/2020 renin, metanephrines, TSH WNL --- Called [facility] and 24h urine specimen unavailable?\par \par Renal US 6/2020 negative for СВЕТЛАНА\par \par Labs 1/14/20: Hgb 17.6, Hct 50.1, platelets 232, BUN 24, Cr 1.49, K 3.9, AST 27, ALT 48, Chol 240, Trig 302, HDL 42, LDl 138, Hgb A1C 5.3, TSH 2.420, C-Reactive Protein, Cardiac 1.02, , Mag 2.1\par \par EKG 1/7/20: demonstrates normal sinus rhythm LVH with nonspecific ST changes\par \par Echocardiogram 2/11/16: Normal LVSF. Normal LV cavity size. Normal LV wall thickness. EF 55-60%.\par \par ETT 10/27/14: Negative exercise EKG. No arrhythmias during exercise. Hypertensive response to exercise. Excellent exercise capacity at 13.1 mets.\par \par Echo 7/16/14: Normal LV size. Overall EF 15-20%. Mild mitral insufficiency. Concentric LVH. Dilated RV and LA.\par \par

## 2021-05-14 ENCOUNTER — APPOINTMENT (OUTPATIENT)
Dept: CARDIOLOGY | Facility: CLINIC | Age: 31
End: 2021-05-14
Payer: COMMERCIAL

## 2021-05-14 ENCOUNTER — RESULT CHARGE (OUTPATIENT)
Age: 31
End: 2021-05-14

## 2021-05-14 VITALS
HEART RATE: 76 BPM | OXYGEN SATURATION: 98 % | WEIGHT: 275 LBS | BODY MASS INDEX: 35.29 KG/M2 | DIASTOLIC BLOOD PRESSURE: 35 MMHG | SYSTOLIC BLOOD PRESSURE: 142 MMHG | RESPIRATION RATE: 12 BRPM | HEIGHT: 74 IN | TEMPERATURE: 97.2 F

## 2021-05-14 DIAGNOSIS — Z83.2 FAMILY HISTORY OF DISEASES OF THE BLOOD AND BLOOD-FORMING ORGANS AND CERTAIN DISORDERS INVOLVING THE IMMUNE MECHANISM: ICD-10-CM

## 2021-05-14 DIAGNOSIS — E78.1 PURE HYPERGLYCERIDEMIA: ICD-10-CM

## 2021-05-14 PROCEDURE — 99215 OFFICE O/P EST HI 40 MIN: CPT

## 2021-05-14 PROCEDURE — 99072 ADDL SUPL MATRL&STAF TM PHE: CPT

## 2021-05-17 PROBLEM — E78.1 HYPERTRIGLYCERIDEMIA: Noted: 2020-08-11

## 2021-05-17 PROBLEM — Z83.2 FAMILY HISTORY OF BLOOD DYSCRASIA: Status: ACTIVE | Noted: 2021-05-17

## 2021-05-17 NOTE — CARDIOLOGY SUMMARY
[de-identified] : 12/01/2020, normal sinus rhythm at 70 bpm. There was voltage for LVH (Rayshawn criteria) and nonspecific ST depression, abnormal but nondiagnostic for this age.  [de-identified] : 10/27/14, Negative exercise EKG. No arrhythmias during exercise. Hypertensive response to exercise. Excellent exercise capacity at 13.1 mets. [de-identified] : 1/15/21: Echo 1/15/21: EF 60%. Mild MR. Ascending aorta 3.8 cm (borderline dilation). Mild LVH. Normal wall motion. Minimal TR. Minimal [place]. Compared with echo 1/20/20, increase in LVH.

## 2021-05-17 NOTE — REVIEW OF SYSTEMS
[Weight Gain (___ Lbs)] : [unfilled] ~Ulb weight gain [Negative] : Heme/Lymph [Headache] : no headache [Feeling Fatigued] : not feeling fatigued [SOB] : no shortness of breath [Dyspnea on exertion] : not dyspnea during exertion [Chest Discomfort] : no chest discomfort [Cough] : no cough

## 2021-05-17 NOTE — DISCUSSION/SUMMARY
[Hypertension] : hypertension [Stage II] : Stage II [NYHA Class I] : NYHA Class I [Essential Hypertension] : essential hypertension [Renal Parenchymal Disease] : renal parenchymal disease [Hypertensive Cardiomyopathy] : hypertensive cardiomyopathy [Diastolic Heart Failure] : diastolic heart failure [Sleep Apnea] : sleep apnea [Continue] : continuing beta blockers [Increase] : increasing aldosterone antagonists [Decrease] : decreasing vasodilators [Hypercholesterolemia] : hypercholesterolemia [Hypertriglyceridemia] : essential hypertriglyceridemia [Stable] : stable [Responding to Treatment] : responding to treatment [Diet Modification] : diet modification [Exercise] : exercise [<130] : goal is <130 [<150] : goal is <150 [>40] : goal is >40 [Exercise Regimen] : an exercise regimen [Dietary Modification] : dietary modification [Weight Loss] : weight loss [ETOH Moderation] : alcohol moderation [Low Sodium Diet] : low sodium diet [NSAIDs Avoidance] : non-steroidal anti-inflammatory drugs avoidance [Patient] : the patient [At Goal] : The HDL is not at goal [de-identified] : low HDL-C [de-identified] : lipids 4/9/21 were not at goal [de-identified] : maintain atorvastatin 40 mg daily at bedtime. Maintain omega-3 ethyl esters 1 GM capsule, 2 capsules twice daily (or 4 capsules daily) [de-identified] : reduction in second hand smoke exposure [de-identified] : 112 mg/dL on 4/9/21 [FreeTextEntry5] : 470 mg/dL on 4/9/12 (goal is for fasting specimen) [de-identified] : 37 mg/dL on 4/9/21 [de-identified] : Total CK was 334 U/L on 4/9/21 (normal 39 - 439) [de-identified] : overall BP was close to goal. Initial office BP, obtained by physician, was 142/95 mm Hg. Fifteen serial automated (Dinamap ™) hemodynamic measurements were obtained. Mean BP for all automated measurements was 136/82 ±4.7/5.9 mm Hg with mean HR 74 ± 5.9 bpm and mean oxygen saturation by pulse oximetry 98 ± 0.5% on room air. The lowest seated BP was 131/84 mm Hg. The last seated BP was 137/74 mm Hg with HR 72 bpm. Upon standing, BP was 129/75 mm Hg with HR 79 bpm. The last standing BP was 141/80 mm Hg with HR 82 bpm. The patient did not experience lightheadedness or palpitations upon or while standing. Out of all 15 readings, 27% of systolic readings was >= 140 mm Hg and 13% diastolic readings was >= 90 mm Hg. Serum creatinine was 1.47 mg/dL on 4/9/21 (normal 0.5 – 1.3). Serum potassium was 4.3 mmol/L on 4/9/21 (normal 3.5 – 5.3). Echocardiogram 1/15/21 noted mild concentric LVH with normal wall motion; LVEF 60%. The ascending aorta was borderline dilated at 3.8 cm. Secondary causes of severe and resistant hypertension include significant prior alcohol consumption, renal parenchymal hypertension and JUAN. Sleep study 5/13/15 showed severe JUAN with lowest SpO2 75%; apnea - hypopnea index was 48/hr and respiratory disturbance index 57/hr. He was fitted for CPAP. Of note, there was significant clinical response of BP with the addition of spironolactone to his regimen.  Labs 3/11/2020 noted PRA 5.265 ng/mL/hr, serum aldosterone 20.2 ng/dL, free plasma metanephrine 27 pg/mL, free plasma normetanephrine 62 pg/mL, TSH 2.01 uIU/mL (all within normal range). Renal US 6/19/2020 showed no renal artery stenosis. [de-identified] : decrease hydralazine from 50 mg 3X daily to 50 mg twice daily. discontinue diltiazem and substitute amlodipine (Norvasc) 5 mg daily. Maintain carvedilol (Coreg) 25 mg twice daily. Maintain losartan 25 mg twice daily. Increase spironolactone from 12.5 to 25 mg daily.  [de-identified] : After two - four weeks on current regimen, labs will be needed to assess serum creatinine and watch for hyperkalemia.  Labs were ordered for metabolic profile, serum uric acid, urinalysis, urine microalbumin / creatinine, spot urine sodium and spot urine urea. Of note, in one small randomized clinical trial, spironolactone reduced the severity of JUAN and reduced BP in individuals with resistant hypertension and with moderate-to-severe JUAN. [FreeTextEntry1] : \par WEIGHT GOALS:\par \par Category				BMI range - kg/m2	BMI Prime\par Very severely underweight		less than 15		less than 0.60	\par Severely underweight			from 15.0 to 16.0		from 0.60 to 0.64	\par Underweight				from 16.0 to 18.5		from 0.64 to 0.74	\par Normal (healthy weight)			from 18.5 to 25		from 0.74 to 1.0	\par Overweight				from 25 to 30		from 1.0 to 1.2	\par Obese Class I (Moderately obese)		from 30 to 35		from 1.2 to 1.4	\par Obese Class II (Severely obese)		from 35 to 40		from 1.4 to 1.6	\par Obese Class III (Very severely obese)	over 40			over 1.6	\par \par Your current weight is 275 lbs. Given current weight and height 6'2", your calculated body mass index (BMI) is 35.5 kg/sqm. Normal BMI is 18.5-25 kg/sqm. Thus current weight is in the obese class II category. Abdominal waist circumference is measured at the level of the umbilicus and is thus not a pants waist measurement. Your current abdominal waist circumference is 47.5 inches. Normal abdominal waist circumference is < 32 inches for women and < 37 inches for men.\par \par DIETARY SALT (SODIUM); DASH DIET AND BLOOD PRESSURE:\par To decrease the sodium in your diet: \par · Use fresh vegetables and foods as much as possible.\par · Avoid canned and processed foods. Cured meats such as nixon, ham, and sausages are high in salt.\par · Try using different herbs and spices in your cooking instead of salt.\par · In restaurants, avoid foods with sauces, cheese, and cured meats. Ask for low-sodium choices.\par To get more potassium in your diet, eat:\par · Bananas, fresh or dried apricots, peaches, citrus fruits, melons\par · Cauliflower, broccoli, tomatoes, carrots, raw spinach, beet greens, potatoes\par To get more magnesium in your diet, eat:\par · Whole grain foods, leafy green vegetables, dried fruits\par • Fish and seafood, poultry \par To get more calcium in your diet, eat:\par · Nonfat milk, yogurt, and low-fat cheeses \par · [place] and sardines\par · Cooked dried beans\par · Broccoli, kale, and bok devan\par · Tofu or soybean curd\par DASH stands for "dietary approaches to stop hypertension." The DASH diet is low in total and saturated fat. It is rich in fruits, vegetables, and low-fat dairy foods. The diet allows you to get natural fiber, calcium, and magnesium from food. It prevents or lowers high blood pressure. It can also help lower cholesterol in your blood. \par Don't change how you eat all at once. It's much more likely that you'll succeed if you make only one or two small changes at a time. Wait until those changes are a habit, then make a couple more changes. Some good starting steps include: \par Add one serving of vegetables to your meals at lunch and dinner. This is an easy way to help you get more vegetables in your diet. \par Have a piece of fruit as an afternoon or after-dinner snack. One glass of juice at breakfast is not enough fruit in your diet. \par Use half your usual amount of butter, margarine, or salad dressing. \par Buy nonfat salad dressing or nonfat sour cream.\par Follow this guide to select your menu of meals. The number of calories we want you to eat each day will tell you how many servings you can choose from each food group.\par Calories: 1600 2100 2600 3100  Servings Grains 6 7 ½ 10 ½ 12 ½  Vegetables 	 4 4 ½ 5 6 Fruit 4 5 5 6 Dairy (low-fat) 2 ½ 3 3 3 ½  Meat, poultry, fish ½ 1 ½ 2 2 ½  Nuts, seeds ½ ½ ½ 1 Fats and sweets 1 ½ 2 ½ 3 4\par Grains and grain products like breads and cereals provide energy, fiber and vitamins. Whole grains have more of these nutrients. One serving equals one of the following:\par Bagel, 1/2 medium; Barley, cooked 1/2 cup; Biscuit, country style 1 medium; Bread, whole wheat, white 1 slice; Cereals, cold or cooked, 1/2 cup; Cornbread, 1 medium piece; Crackers, chad, 2; Crackers, saltine, 4; Dinner roll, 1medium; English muffin, ½; Hamburger bun, ½; Muffin, 1 medium; Pancake, 1 medium; Pasta, 1/2 cup cooked; Mindy, 1/2 large or 1 small; Popcorn, 1 cup popped; Pretzels, 1 ounce; Rice, white, brown, or wild, 1/2 cup cooked; Tortillas, corn or flour, 1 medium; Waffle, 1 medium; Wheat germ, 1/4 cup; \par Vegetables are rich sources of potassium, magnesium, and fiber. One serving is 1/2 cup of any of the following cooked vegetables:\par Asparagus, Beans (green, yellow), Beets, Broccoli, [place] Sprouts, Carrots, Cauliflower, [place], chicory, mustard and turnip (and other) greens, Corn, Kale, Lima beans, Mixed vegetables, Okra, Parsnips, Peas, green, Potatoes (1/2 medium or 1/2 cup mashed), Pumpkin, Rutabaga, Spaghetti or tomato sauce, Spinach, Squash (zucchini or yellow), Stewed tomatoes, Succotash, Sweet potatoes, Turnips, Yam \par Raw vegetables: Carrots,1/2 cup; Celery, 1/2 cup; Lettuce (rosalinda, loose-leaf, green-leaf), 1 cup; Peppers, 1/2 cup; Spinach, 1 cup; Tomato, 1/2\par Fruits and fruit juices are important sources of potassium and magnesium. Fruits also contain fiber and are low in sodium and fat. One serving equals:\par Any fruit juice, # cup (6 ounces); Canned or frozen fruit, ½ cup (includes applesauce); Dried fruit, ¼ cup; \par Fresh fruit:\par Apple, 1 medium; Apricots, 2 medium; Banana, 1 medium; Berries, 1/2 cup; Melon, 1 wedge, or 1/2 cup; Cherries, 10; Grapefruit, 1/2; Grapes, 15; Kiwi, 1 medium; Camron, 1/2 small; Nectarine, 1 medium; Orange, 1 medium; Peach, 1 medium; Pear, 1 medium; Pineapple, 1/2 cup; Plums, 2 medium; Tangerine, 1 large\par Dairy foods provide protein and calcium. Use low-fat or nonfat dairy products to cut down on fat. One serving equals:\par Skim milk, 1 cup (8 ounces); 1% low fat milk, 1 cup (8 ounces); 2% low fat milk, 1 cup (8 ounces) nonfat dry milk powder (1/3 cup); Low-fat cottage cheese, 1 cup (8 ounces); Parmesan cheese, 1 tablespoon; Mozzarella cheese, part skim, 1/4 cup (1 ounce); Low-fat cheddar cheese, 11/2 ounces; Ricotta cheese, part skim milk or nonfat, 1/4 cup (11/2 ounces); Other low fat or nonfat cheeses (11/2 oz.); Low-fat or nonfat yogurt, fruit-flavored or plain, 1 cup (8 ounces)\par Low-fat or nonfat frozen yogurt, 1/2 cup (4 ounces); Note: People who can't digest dairy products can try taking lactase enzyme pills or drops (available at drug and grocery stores) when they eat dairy. There is also milk available with the enzyme already added. Or you can buy lactose-free milk.\par Meat, poultry, and fish are good sources of protein and magnesium. One serving equals:\par Lean meat including beef, veal, or pork, 3 ounces cooked; Skinless, white meat poultry including turkey, chicken, 3 ounces; Fish and shellfish, 3 ounces cooked; Low-fat luncheon meats, 1 ounce; Egg, 1 medium; Tofu, 3 ounces\par Note: Three ounces of cooked meat is about the size of a deck of cards.\par Nuts, seeds, and legumes are rich sources of energy, magnesium, potassium, protein and fiber. Nuts and seeds are also high in fat, so portions should be small.\par Almonds, 1/3 cup; Beans such as kidney, monteiro, and navy, 1/2 cup cooked; Chickpeas and lentils, 1/2 cup cooked; Cashews, 1/3 cup; Filberts, 1/3 cup; Mixed nuts, 1/3 cup; Peanut butter, 2 tablespoons; Peanuts, 1/3 cup; Sesame seeds, 2 tablespoons; Sunflower seeds, 2 tablespoons; Tofu, regular, 3 ounces; Walnuts, 1/3 cup \par Following the above diet will give you about 27% fat in your diet. The goal is to have 30% or less of the calories you eat each day be from fat. To meet that goal, do not eat more than 2-3 servings daily of added fat. Also try to limit sweets. One serving equals:\par Butter or margarine, 1 teaspoon; Mayonnaise, 1 teaspoon; Low-fat mayonnaise, 1 tablespoon; Salad dressing, 1 tablespoon; Low-fat salad dressing, 2 tablespoons; Oil, 1 teaspoon (use olive, canola, safflower, or other vegetable oils); Candy, hard, 3 pieces; Jelly or jam, 1 tablespoon); Jell-O, 1/2 cup; Jelly beans, 1/2 ounce; Maple syrup, 1 tablespoon; Popsicle, 1; Sherbet or nonfat or low-fat frozen yogurt, 1/2 cup; Sugar, 1 tablespoon; Sugared lemonade or fruit punch, 1 cup (8 ounces); Note: Try diet fruit-flavored gelatin or frozen, canned, or fresh fruit for dessert.\par \par Small amounts of alcohol may have benefits to the heart and blood pressure. However, excess use of alcohol can cause damage to the brain, liver and other organs. It can lead to high blood pressure. Drinking more than two drinks (15 ml)  every day can raise your blood pressure. 15 ml of alcohol equals: \par • one 12-ounce bottle of beer \par • a half glass (5 ounces) of wine \par • 1 ounce (one shot) of 100 proof hard liquor\par

## 2021-05-17 NOTE — PHYSICAL EXAM
[Well Developed] : well developed [Well Nourished] : well nourished [No Acute Distress] : no acute distress [Normal Conjunctiva] : normal conjunctiva [Normal Venous Pressure] : normal venous pressure [No Carotid Bruit] : no carotid bruit [Normal S1, S2] : normal S1, S2 [No Rub] : no rub [5th Left ICS - MCL] : palpated at the 5th LICS in the midclavicular line [Diffuse] : diffuse [No Precordial Heave] : no precordial heave was noted [Normal Rate] : normal [Rhythm Regular] : regular [Normal S1] : normal S1 [No Gallop] : no gallop heard [No Murmur] : no murmurs heard [No Pitting Edema] : no pitting edema present [2+] : left 2+ [No Abnormalities] : the abdominal aorta was not enlarged and no bruit was heard [Clear Lung Fields] : clear lung fields [Good Air Entry] : good air entry [No Respiratory Distress] : no respiratory distress  [Soft] : abdomen soft [Non Tender] : non-tender [No Masses/organomegaly] : no masses/organomegaly [Normal Bowel Sounds] : normal bowel sounds [Normal Gait] : normal gait [No Edema] : no edema [No Cyanosis] : no cyanosis [No Clubbing] : no clubbing [No Varicosities] : no varicosities [No Rash] : no rash [No Skin Lesions] : no skin lesions [Moves all extremities] : moves all extremities [No Focal Deficits] : no focal deficits [Normal Speech] : normal speech [Normal] : alert and oriented, normal memory [Alert and Oriented] : alert and oriented [Normal memory] : normal memory [Apical Thrill] : no thrill palpable at the apex [Click] : no click [Pericardial Rub] : no pericardial rub [Rt] : no varicose veins of the right leg [Lt] : no varicose veins of the left leg [Right Carotid Bruit] : no bruit heard over the right carotid [Left Carotid Bruit] : no bruit heard over the left carotid [Prolonged Exp Time] : expiratory time was not prolonged [Increased E/I Ratio] : no increase in the expiratory/inspiratory ratio  [Exp Wheezing] : no expiratory wheezing was heard [de-identified] : no oral crowding, no macroglossia, no observed anterior nasal septal deviation [de-identified] : thick neck structure [de-identified] : left ankle circumf 9.75 in; right ankle circumf 10.25 in. Calf circumf 18 in bilat

## 2021-05-17 NOTE — HISTORY OF PRESENT ILLNESS
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 He was initially seen 5/14/21, having been first aware of elevated BP in high school during assessments prior to participating in football and [employer]. There were no facial injuries (eg concussion or fractured nasal septum).  He did not join . At age 24 years (2014), he was hospitalized for cough and SOB due to severe HTN with acute heart systolic heart failure. TTE 7/16/14 showed concentric LVH with severe diffuse hypokinesis; LVEF 15-20%. He was treated with carvedilol, enalapril, hydralazine and isosorbide. Regimen at time of initial assessment included carvedilol, diltiazem ER, hydralazine, losartan and spironolactone. Sleep study 5/13/15 showed severe JUAN with lowest SpO2 75%; apnea - hypopnea index was 48/hr and respiratory disturbance index 57/hr. He was fitted for CPAP. Labs 3/11/2020 noted PRA 5.265 ng/mL/hr, serum aldosterone 20.2 ng/dL, free plasma metanephrine 27 pg/mL, free plasma normetanephrine 62 pg/mL, TSH 2.01 uIU/mL. renal US 6/19/2020 showed no renal artery stenosis. The patient noted significant alcohol consumption during college years. At time of  initial visit, he consumed 6 beers or 1-2 cocktails on weekend days. He smoked 1-2 PPD cigarettes for 10 years, quitting 2014. There was second hand exposure at work. There was no substance abuse. He did not routinely use NSAIDs or other OTC meds / supplements. There was no significant family h/o hypertension, stroke or ESRD. The patient limited salt intake since 2014. He was  for [employer]. There was recent stress over birth of first child (3 mos ago). He gained weight between Dec 2020 and April 2021. He tolerated current antihypertensive regimen, though noted occasionally missing middle hydralazine dose. He used CPAP nightly, disliking the machine, but noting improved daytime stamina. He exercised in gym 6-7 days per week. He completed Pfizer COVID vaccinations 4/6/21.

## 2021-05-17 NOTE — ADDENDUM
[FreeTextEntry1] : I soent 60 min face to face time with the patient from 14:30 to 15:30 on 5/14/21. Thirty min were devoted to patient counseling as outlined above in the End of Visit section. Prior to this visit, I reviewed records of Dr Ed Villafana and colleagues. Labs 4/9/21 were reviewed as were prior labs and tests.

## 2021-05-17 NOTE — REASON FOR VISIT
[Hypertension] : hypertension [CV Risk Factors and Non-Cardiac Disease] : CV risk factors and non-cardiac disease

## 2021-07-02 ENCOUNTER — APPOINTMENT (OUTPATIENT)
Dept: CARDIOLOGY | Facility: CLINIC | Age: 31
End: 2021-07-02

## 2021-08-17 ENCOUNTER — RX RENEWAL (OUTPATIENT)
Age: 31
End: 2021-08-17

## 2021-09-10 DIAGNOSIS — Z00.00 ENCOUNTER FOR GENERAL ADULT MEDICAL EXAMINATION W/OUT ABNORMAL FINDINGS: ICD-10-CM

## 2021-09-24 ENCOUNTER — APPOINTMENT (OUTPATIENT)
Dept: CARDIOLOGY | Facility: CLINIC | Age: 31
End: 2021-09-24
Payer: COMMERCIAL

## 2021-09-24 VITALS
TEMPERATURE: 97.7 F | HEIGHT: 74 IN | DIASTOLIC BLOOD PRESSURE: 100 MMHG | OXYGEN SATURATION: 99 % | SYSTOLIC BLOOD PRESSURE: 164 MMHG | BODY MASS INDEX: 35.55 KG/M2 | HEART RATE: 102 BPM | WEIGHT: 277 LBS

## 2021-09-24 PROCEDURE — 99214 OFFICE O/P EST MOD 30 MIN: CPT

## 2021-09-24 PROCEDURE — 99072 ADDL SUPL MATRL&STAF TM PHE: CPT

## 2021-09-24 RX ORDER — MULTIVITAMIN
CAPSULE ORAL
Qty: 90 | Refills: 3 | Status: DISCONTINUED | COMMUNITY
Start: 2021-05-17 | End: 2021-09-24

## 2021-09-26 NOTE — ASSESSMENT
[FreeTextEntry1] : \par Requested outside nephrology note.  \par Reviewed outside hypertension specialist note.  \par Medications refilled.  \par Blood work has been requested.  \par Close clinical follow-up.  \par Instructed to notify our office if blood pressure persistently elevated.\par \par HTN: BP elevated. Asymptomatic. \par CKD \par JUAN\par HL\par \par Patient remains asymptomatic. No clinical evidence of HF. \par Surveillance monitoring of echocardiography \par \par Restart meds\par Losartan, Diltiazem, hydralazine, coreg, Spironolactone \par Monitor renal function\par Sequential iterative approach to BP control\par Long term would prefer less frequent dosing to ensure compliance\par Low salt diet\par Increase aerobic exercise regimen, weight loss.\par Dollow up with HTN specialist, Dr. Juan Martinez and renal, Dr. Feng.\par CPAP.\par \par Discussed red flag symptoms, which would warrant sooner or emergent medical evaluation.\par Any questions and concerns were addressed and resolved.\par

## 2021-09-26 NOTE — HISTORY OF PRESENT ILLNESS
[FreeTextEntry1] : KATINA PARHAM  is a 30 year old  M\par \par PMH of abnormal EKG, HTN, heart failure, cardiomyopathy, CRI, JUAN on CPAP, and past tobacco use.\par \par In 2014 he presented to an urgent care with cough and uncontrolled BP. He was transferred to the hospital and admitted to the ICU. \par There was LV and renal dysfunction. \par Told his heart was enlarged. 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 \par Initially followed at [facility], started on medical therapy. \par Previously, he was prescribed carvedilol, enalapril, hydralazine, isosorbide.\par Saw Jung, nephrology\par Saw Michelle, HTN\par \par Recent labs with creatinine improved, lisinopril/hctz was started. Subsequently, K mayte to 5.7, creat to 2.18 so meds adjusted\par \par Last visit, Spironolactone started and also advised to STOP a work out supplement he was taking from [employer]. \par Then BP well controlled today\par \par In the interim there have been no hospitalizations or procedures. \par Today he returns to the office and has been out of all of his blood pressure medications !!!\par he overall feels well \par he is working as manager \par still goes to the gym several days a week he does light weights\par compliant with his CPAP \par He denies chest pain, pressure, palpitations, unusual shortness of breath, orthopnea, LE edema, lightheadedness, dizziness, near syncope or syncope. \par \par Blood work April 2021 hemoglobin 15.8 creatinine 1.5 total cholesterol 231  TSH 1.8 BNP 1 4 magnesium 2.2 \par Labs 4/9/21: WBC 6.1, Hgb 15.8, HCT 45.8, plt 209, Gluv 82, BUN 22, Cr 1.47, Na 141, k 4.3, Ca 9.7, AST 33, ALT 66, Chol 231, Trigs 470, HDL 37, , A1C 5.6, TSH 1.840, CRP 7.68, BNP 15, , Mag 2.2.\par Echo 1/15/21: EF 60%. Mild MR. Ascending aorta 3.8 cm (borderline dilation). Mild LVH. Normal wall motion. Minimal TR. Minimal NJ. Compared with echo 1/20/20, increase in LVH.\par EKG 12/1/20: SR at 70 bpm, NJ interval 144 ms, QTc 379 ms, nonspecific ST-T wave abnormalities \par Renal US 6/2020 negative for СВЕТЛАНА

## 2021-10-04 ENCOUNTER — RX RENEWAL (OUTPATIENT)
Age: 31
End: 2021-10-04

## 2021-10-24 NOTE — HISTORY OF PRESENT ILLNESS
[FreeTextEntry1] : KATINA PARHAM  is a 31 year old  M\par \par \par PMH of abnormal EKG, HTN, heart failure, cardiomyopathy, CRI, JUAN on CPAP, and past tobacco use.\par \par In 2014 he presented to an urgent care with cough and uncontrolled BP. He was transferred to the hospital and admitted to the ICU. \par There was LV and renal dysfunction. \par Told his heart was enlarged. 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 \par Initially followed at [facility], started on medical therapy. \par Previously, he was prescribed carvedilol, enalapril, hydralazine, isosorbide.\par Saw Jung, nephrology\par Saw Michelle, HTN\par \par Recent labs with creatinine improved, lisinopril/hctz was started. Subsequently, K mayte to 5.7, creat to 2.18 so meds adjusted\par \par Last visit, Spironolactone started and also advised to STOP a work out supplement he was taking from [employer]. \par Then BP well controlled today\par \par In the interim there have been no hospitalizations or procedures. \par Today he returns to the office and has been out of all of his blood pressure medications !!!\par he overall feels well \par he is working as manager \par still goes to the gym several days a week he does light weights\par compliant with his CPAP \par He denies chest pain, pressure, palpitations, unusual shortness of breath, orthopnea, LE edema, lightheadedness, dizziness, near syncope or syncope. \par \par Blood work April 2021 hemoglobin 15.8 creatinine 1.5 total cholesterol 231  TSH 1.8 BNP 1 4 magnesium 2.2 \par Labs 4/9/21: WBC 6.1, Hgb 15.8, HCT 45.8, plt 209, Gluv 82, BUN 22, Cr 1.47, Na 141, k 4.3, Ca 9.7, AST 33, ALT 66, Chol 231, Trigs 470, HDL 37, , A1C 5.6, TSH 1.840, CRP 7.68, BNP 15, , Mag 2.2.\par Echo 1/15/21: EF 60%. Mild MR. Ascending aorta 3.8 cm (borderline dilation). Mild LVH. Normal wall motion. Minimal TR. Minimal AK. Compared with echo 1/20/20, increase in LVH.\par EKG 12/1/20: SR at 70 bpm, AK interval 144 ms, QTc 379 ms, nonspecific ST-T wave abnormalities \par Renal US 6/2020 negative for СВЕТЛАНА\par \par \par Requested outside nephrology note.  \par Reviewed outside hypertension specialist note.  \par Medications refilled.  \par Blood work has been requested.  \par Close clinical follow-up.  \par Instructed to notify our office if blood pressure persistently elevated.\par \par HTN: BP elevated. Asymptomatic. \par CKD \par JUAN\par HL\par \par Patient remains asymptomatic. No clinical evidence of HF. \par Surveillance monitoring of echocardiography \par \par Restart meds\par Losartan, Diltiazem, hydralazine, coreg, Spironolactone \par Monitor renal function\par Sequential iterative approach to BP control\par Long term would prefer less frequent dosing to ensure compliance\par Low salt diet\par Increase aerobic exercise regimen, weight loss.\par Dollow up with HTN specialist, Dr. Juan Martinez and renal, Dr. Feng.\par CPAP.\par \par Discussed red flag symptoms, which would warrant sooner or emergent medical evaluation.\par Any questions and concerns were addressed and resolved.\par

## 2021-10-26 ENCOUNTER — APPOINTMENT (OUTPATIENT)
Dept: CARDIOLOGY | Facility: CLINIC | Age: 31
End: 2021-10-26

## 2021-11-15 ENCOUNTER — APPOINTMENT (OUTPATIENT)
Dept: CARDIOLOGY | Facility: CLINIC | Age: 31
End: 2021-11-15

## 2021-11-30 ENCOUNTER — APPOINTMENT (OUTPATIENT)
Dept: CARDIOLOGY | Facility: CLINIC | Age: 31
End: 2021-11-30

## 2022-01-07 ENCOUNTER — APPOINTMENT (OUTPATIENT)
Dept: CARDIOLOGY | Facility: CLINIC | Age: 32
End: 2022-01-07

## 2022-01-10 DIAGNOSIS — N17.9 ACUTE KIDNEY FAILURE, UNSPECIFIED: ICD-10-CM

## 2022-01-10 RX ORDER — DOXYCYCLINE HYCLATE 100 MG/1
100 CAPSULE ORAL
Qty: 14 | Refills: 0 | Status: COMPLETED | COMMUNITY
Start: 2021-12-19 | End: 2022-01-10

## 2022-02-18 ENCOUNTER — NON-APPOINTMENT (OUTPATIENT)
Age: 32
End: 2022-02-18

## 2022-02-18 ENCOUNTER — APPOINTMENT (OUTPATIENT)
Dept: CARDIOLOGY | Facility: CLINIC | Age: 32
End: 2022-02-18
Payer: COMMERCIAL

## 2022-02-18 VITALS
HEIGHT: 74 IN | TEMPERATURE: 97.7 F | SYSTOLIC BLOOD PRESSURE: 140 MMHG | HEART RATE: 62 BPM | WEIGHT: 275 LBS | OXYGEN SATURATION: 98 % | DIASTOLIC BLOOD PRESSURE: 88 MMHG | BODY MASS INDEX: 35.29 KG/M2

## 2022-02-18 PROCEDURE — 93000 ELECTROCARDIOGRAM COMPLETE: CPT

## 2022-02-18 PROCEDURE — 99214 OFFICE O/P EST MOD 30 MIN: CPT

## 2022-02-18 PROCEDURE — 99072 ADDL SUPL MATRL&STAF TM PHE: CPT

## 2022-02-18 NOTE — ASSESSMENT
[FreeTextEntry1] : \par HTN \par CKD proteinuria bx pending\par JUAN\par HL\par h/o HF, CMP\par \par Follow up echo\par \par emphasized medication adherence and compliance this appears to have improved\par home blood pressure monitoring \par increase hydralazine \par limit NSAIDs \par \par Sequential iterative approach to BP control\par Low salt diet\par Increase aerobic exercise regimen, weight loss.\par CPAP.\par \par F/u nephro and hypertension specialist  \par \par Close clinical follow-up.  \par Instructed to notify our office if blood pressure persistently elevated.\par \par Discussed red flag symptoms, which would warrant sooner or emergent medical evaluation.\par Any questions and concerns were addressed and resolved.

## 2022-02-18 NOTE — HISTORY OF PRESENT ILLNESS
[FreeTextEntry1] : KATINA PARHAM  is a 31 year old  M\par \par \par \par \par PMH of abnormal EKG, HTN, heart failure, cardiomyopathy, CRI, JUAN on CPAP, and past tobacco use.\par \par In 2014 he presented to an urgent care with cough and uncontrolled BP. \par Transferred to the hospital and admitted to the ICU. \par There was LV and renal dysfunction. \par Told heart was enlarged. 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 \par Initially followed at [facility], started on medical therapy. \par Previously prescribed carvedilol, enalapril, hydralazine, isosorbide.\par Saw Jung, nephrology\par Saw Michelle, HTN\par \par he overall feels well \par he is working as manager \par denies chest pain, pressure, palpitations, unusual shortness of breath, orthopnea, LE edema, lightheadedness, dizziness, near syncope or syncope. \par \par Interim he reports being scheduled for kidney biopsy.  He also had his dose of atorvastatin increased.  \par January 2022 hemoglobin 14.8 creatinine 1.7 proteinuria \par \par Blood work April 2021 hemoglobin 15.8 creatinine 1.5 total cholesterol 231  TSH 1.8 BNP 14 magnesium 2.2 \par Labs 4/9/21: WBC 6.1, Hgb 15.8, HCT 45.8, plt 209, Gluv 82, BUN 22, Cr 1.47, Na 141, k 4.3, Ca 9.7, AST 33, ALT 66, Chol 231, Trigs 470, HDL 37, , A1C 5.6, TSH 1.840, CRP 7.68, BNP 15, , Mag 2.2.\par Echo 1/15/21: EF 60%. Mild MR. Ascending aorta 3.8 cm (borderline dilation). Mild LVH. Normal wall motion. Minimal TR. Minimal FL. Compared with echo 1/20/20, increase in LVH.\par EKG 12/1/20: SR at 70 bpm, FL interval 144 ms, QTc 379 ms, nonspecific ST-T wave abnormalities \par Renal US 6/2020 negative for СВЕТЛАНА\par \par

## 2022-03-22 ENCOUNTER — APPOINTMENT (OUTPATIENT)
Dept: CARDIOLOGY | Facility: CLINIC | Age: 32
End: 2022-03-22
Payer: COMMERCIAL

## 2022-03-22 PROCEDURE — 99072 ADDL SUPL MATRL&STAF TM PHE: CPT

## 2022-03-22 PROCEDURE — 93306 TTE W/DOPPLER COMPLETE: CPT

## 2022-03-30 ENCOUNTER — NON-APPOINTMENT (OUTPATIENT)
Age: 32
End: 2022-03-30

## 2022-04-12 ENCOUNTER — NON-APPOINTMENT (OUTPATIENT)
Age: 32
End: 2022-04-12

## 2022-04-15 ENCOUNTER — APPOINTMENT (OUTPATIENT)
Dept: CARDIOLOGY | Facility: CLINIC | Age: 32
End: 2022-04-15
Payer: COMMERCIAL

## 2022-04-15 ENCOUNTER — NON-APPOINTMENT (OUTPATIENT)
Age: 32
End: 2022-04-15

## 2022-04-15 VITALS — DIASTOLIC BLOOD PRESSURE: 84 MMHG | HEART RATE: 56 BPM | SYSTOLIC BLOOD PRESSURE: 136 MMHG | OXYGEN SATURATION: 97 %

## 2022-04-15 VITALS — SYSTOLIC BLOOD PRESSURE: 131 MMHG | DIASTOLIC BLOOD PRESSURE: 75 MMHG | OXYGEN SATURATION: 97 % | HEART RATE: 57 BPM

## 2022-04-15 VITALS
WEIGHT: 269 LBS | TEMPERATURE: 97.2 F | DIASTOLIC BLOOD PRESSURE: 85 MMHG | RESPIRATION RATE: 16 BRPM | HEIGHT: 74 IN | OXYGEN SATURATION: 98 % | SYSTOLIC BLOOD PRESSURE: 128 MMHG | HEART RATE: 58 BPM | BODY MASS INDEX: 34.52 KG/M2

## 2022-04-15 VITALS — HEART RATE: 66 BPM | OXYGEN SATURATION: 98 % | DIASTOLIC BLOOD PRESSURE: 78 MMHG | SYSTOLIC BLOOD PRESSURE: 142 MMHG

## 2022-04-15 VITALS — OXYGEN SATURATION: 97 % | HEART RATE: 70 BPM | SYSTOLIC BLOOD PRESSURE: 130 MMHG | DIASTOLIC BLOOD PRESSURE: 73 MMHG

## 2022-04-15 DIAGNOSIS — R06.2 WHEEZING: ICD-10-CM

## 2022-04-15 DIAGNOSIS — I10 ESSENTIAL (PRIMARY) HYPERTENSION: ICD-10-CM

## 2022-04-15 DIAGNOSIS — R79.82 ELEVATED C-REACTIVE PROTEIN (CRP): ICD-10-CM

## 2022-04-15 DIAGNOSIS — I50.9 HEART FAILURE, UNSPECIFIED: ICD-10-CM

## 2022-04-15 DIAGNOSIS — E78.6 LIPOPROTEIN DEFICIENCY: ICD-10-CM

## 2022-04-15 DIAGNOSIS — Z71.3 DIETARY COUNSELING AND SURVEILLANCE: ICD-10-CM

## 2022-04-15 DIAGNOSIS — E66.9 OBESITY, UNSPECIFIED: ICD-10-CM

## 2022-04-15 DIAGNOSIS — R09.82 POSTNASAL DRIP: ICD-10-CM

## 2022-04-15 PROCEDURE — 93040 RHYTHM ECG WITH REPORT: CPT | Mod: 59

## 2022-04-15 PROCEDURE — 93000 ELECTROCARDIOGRAM COMPLETE: CPT

## 2022-04-15 PROCEDURE — 99072 ADDL SUPL MATRL&STAF TM PHE: CPT

## 2022-04-15 PROCEDURE — 99215 OFFICE O/P EST HI 40 MIN: CPT

## 2022-04-15 RX ORDER — ALBUTEROL SULFATE 90 UG/1
108 (90 BASE) INHALANT RESPIRATORY (INHALATION)
Qty: 7 | Refills: 0 | Status: DISCONTINUED | COMMUNITY
Start: 2021-12-19 | End: 2022-04-15

## 2022-04-15 NOTE — HISTORY OF PRESENT ILLNESS
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 He was initially seen 5/14/21, having been first aware of elevated BP in high school during assessments prior to participating in football and [employer]. There were no facial injuries (eg concussion or fractured nasal septum).  He did not join . At age 24 years (2014), he was hospitalized for cough and SOB due to severe HTN with acute heart systolic heart failure. TTE 7/16/14 showed concentric LVH with severe diffuse hypokinesis; LVEF 15-20%. He was treated with carvedilol, enalapril, hydralazine and isosorbide. Regimen at time of initial assessment included carvedilol, diltiazem ER, hydralazine, losartan and spironolactone. Sleep study 5/13/15 showed severe JUAN with lowest SpO2 75%; apnea - hypopnea index was 48/hr and respiratory disturbance index 57/hr. He was fitted for CPAP. Labs 3/11/2020 noted PRA 5.265 ng/mL/hr, serum aldosterone 20.2 ng/dL, free plasma metanephrine 27 pg/mL, free plasma normetanephrine 62 pg/mL, TSH 2.01 uIU/mL. renal US 6/19/2020 showed no renal artery stenosis. The patient noted significant alcohol consumption during college years. At time of  initial visit, he consumed 6 beers or 1-2 cocktails on weekend days. He smoked 1-2 PPD cigarettes for 10 years, quitting 2014. There was second hand exposure at work. There was no substance abuse. He did not routinely use NSAIDs or other OTC meds / supplements. There was no significant family h/o hypertension, stroke or ESRD. The patient limited salt intake since 2014. He was  for [employer]. There was recent stress over birth of first child (3 mos ago). He gained weight between Dec 2020 and April 2021. He tolerated current antihypertensive regimen, though noted occasionally missing middle hydralazine dose. He used CPAP nightly, disliking the machine, but noting improved daytime stamina. He exercised in gym 6-7 days per week. He completed Pfizer COVID vaccinations 4/6/21. In March 2022, renal biopsy showed significant arterionephrosclerosis, likely due to hypertension vs genetic etiology. There was no close family member with kidney disease. By 4/15/22, he consumed 3-4 beer on Fri and Saturdays but did not consume EtOH on weekdays. He avoded NSAIDs and used CPAP nightly, sleeping 23:00 - 5:00 and well rested in the morning. There was post nasal drip with occasional wheeze.

## 2022-04-15 NOTE — DISCUSSION/SUMMARY
[FreeTextEntry1] : \par WEIGHT GOALS:\par \par Category				BMI range - kg/m2	BMI Prime\par Very severely underweight		less than 15		less than 0.60	\par Severely underweight			from 15.0 to 16.0		from 0.60 to 0.64	\par Underweight				from 16.0 to 18.5		from 0.64 to 0.74	\par Normal (healthy weight)			from 18.5 to 25		from 0.74 to 1.0	\par Overweight				from 25 to 30		from 1.0 to 1.2	\par Obese Class I (Moderately obese)		from 30 to 35		from 1.2 to 1.4	\par Obese Class II (Severely obese)		from 35 to 40		from 1.4 to 1.6	\par Obese Class III (Very severely obese)	over 40			over 1.6	\par \par Your current weight is 269 lbs (275 lbs on 5/14/21). Given current weight and height 6'2", your calculated body mass index (BMI) is 34.5 kg/sqm. Normal BMI is 18.5-25 kg/sqm. Thus current weight is in the obese class I category. Abdominal waist circumference is measured at the level of the umbilicus and is thus not a pants waist measurement. Your current abdominal waist circumference is 46.5" (47.5" on 5/14/21). Normal abdominal waist circumference is < 32 inches for women and < 37 inches for men.\par \par DIETARY SALT (SODIUM); DASH DIET AND BLOOD PRESSURE:\par To decrease the sodium in your diet: \par · Use fresh vegetables and foods as much as possible.\par · Avoid canned and processed foods. Cured meats such as nixon, ham, and sausages are high in salt.\par · Try using different herbs and spices in your cooking instead of salt.\par · In restaurants, avoid foods with sauces, cheese, and cured meats. Ask for low-sodium choices.\par To get more potassium in your diet, eat:\par · Bananas, fresh or dried apricots, peaches, citrus fruits, melons\par · Cauliflower, broccoli, tomatoes, carrots, raw spinach, beet greens, potatoes\par To get more magnesium in your diet, eat:\par · Whole grain foods, leafy green vegetables, dried fruits\par • Fish and seafood, poultry \par To get more calcium in your diet, eat:\par · Nonfat milk, yogurt, and low-fat cheeses \par · [place] and sardines\par · Cooked dried beans\par · Broccoli, kale, and bok devan\par · Tofu or soybean curd\par DASH stands for "dietary approaches to stop hypertension." The DASH diet is low in total and saturated fat. It is rich in fruits, vegetables, and low-fat dairy foods. The diet allows you to get natural fiber, calcium, and magnesium from food. It prevents or lowers high blood pressure. It can also help lower cholesterol in your blood. \par Don't change how you eat all at once. It's much more likely that you'll succeed if you make only one or two small changes at a time. Wait until those changes are a habit, then make a couple more changes. Some good starting steps include: \par Add one serving of vegetables to your meals at lunch and dinner. This is an easy way to help you get more vegetables in your diet. \par Have a piece of fruit as an afternoon or after-dinner snack. One glass of juice at breakfast is not enough fruit in your diet. \par Use half your usual amount of butter, margarine, or salad dressing. \par Buy nonfat salad dressing or nonfat sour cream.\par Follow this guide to select your menu of meals. The number of calories we want you to eat each day will tell you how many servings you can choose from each food group.\par Calories: 1600 2100 2600 3100  Servings Grains 6 7 ½ 10 ½ 12 ½  Vegetables 	 4 4 ½ 5 6 Fruit 4 5 5 6 Dairy (low-fat) 2 ½ 3 3 3 ½  Meat, poultry, fish ½ 1 ½ 2 2 ½  Nuts, seeds ½ ½ ½ 1 Fats and sweets 1 ½ 2 ½ 3 4\par Grains and grain products like breads and cereals provide energy, fiber and vitamins. Whole grains have more of these nutrients. One serving equals one of the following:\par Bagel, 1/2 medium; Barley, cooked 1/2 cup; Biscuit, country style 1 medium; Bread, whole wheat, white 1 slice; Cereals, cold or cooked, 1/2 cup; Cornbread, 1 medium piece; Crackers, chad, 2; Crackers, saltine, 4; Dinner roll, 1medium; English muffin, ½; Hamburger bun, ½; Muffin, 1 medium; Pancake, 1 medium; Pasta, 1/2 cup cooked; Mindy, 1/2 large or 1 small; Popcorn, 1 cup popped; Pretzels, 1 ounce; Rice, white, brown, or wild, 1/2 cup cooked; Tortillas, corn or flour, 1 medium; Waffle, 1 medium; Wheat germ, 1/4 cup; \par Vegetables are rich sources of potassium, magnesium, and fiber. One serving is 1/2 cup of any of the following cooked vegetables:\par Asparagus, Beans (green, yellow), Beets, Broccoli, [place] Sprouts, Carrots, Cauliflower, Nereida, chicory, mustard and turnip (and other) greens, Corn, Kale, Lima beans, Mixed vegetables, Okra, Parsnips, Peas, green, Potatoes (1/2 medium or 1/2 cup mashed), Pumpkin, Rutabaga, Spaghetti or tomato sauce, Spinach, Squash (zucchini or yellow), Stewed tomatoes, Succotash, Sweet potatoes, Turnips, Yam \par Raw vegetables: Carrots,1/2 cup; Celery, 1/2 cup; Lettuce (rosalinda, loose-leaf, green-leaf), 1 cup; Peppers, 1/2 cup; Spinach, 1 cup; Tomato, 1/2\par Fruits and fruit juices are important sources of potassium and magnesium. Fruits also contain fiber and are low in sodium and fat. One serving equals:\par Any fruit juice, # cup (6 ounces); Canned or frozen fruit, ½ cup (includes applesauce); Dried fruit, ¼ cup; \par Fresh fruit:\par Apple, 1 medium; Apricots, 2 medium; Banana, 1 medium; Berries, 1/2 cup; Melon, 1 wedge, or 1/2 cup; Cherries, 10; Grapefruit, 1/2; Grapes, 15; Kiwi, 1 medium; [place], 1/2 small; Nectarine, 1 medium; Orange, 1 medium; Peach, 1 medium; Pear, 1 medium; Pineapple, 1/2 cup; Plums, 2 medium; Tangerine, 1 large\par Dairy foods provide protein and calcium. Use low-fat or nonfat dairy products to cut down on fat. One serving equals:\par Skim milk, 1 cup (8 ounces); 1% low fat milk, 1 cup (8 ounces); 2% low fat milk, 1 cup (8 ounces) nonfat dry milk powder (1/3 cup); Low-fat cottage cheese, 1 cup (8 ounces); Parmesan cheese, 1 tablespoon; Mozzarella cheese, part skim, 1/4 cup (1 ounce); Low-fat cheddar cheese, 11/2 ounces; Ricotta cheese, part skim milk or nonfat, 1/4 cup (11/2 ounces); Other low fat or nonfat cheeses (11/2 oz.); Low-fat or nonfat yogurt, fruit-flavored or plain, 1 cup (8 ounces)\par Low-fat or nonfat frozen yogurt, 1/2 cup (4 ounces); Note: People who can't digest dairy products can try taking lactase enzyme pills or drops (available at drug and grocery stores) when they eat dairy. There is also milk available with the enzyme already added. Or you can buy lactose-free milk.\par Meat, poultry, and fish are good sources of protein and magnesium. One serving equals:\par Lean meat including beef, veal, or pork, 3 ounces cooked; Skinless, white meat poultry including turkey, chicken, 3 ounces; Fish and shellfish, 3 ounces cooked; Low-fat luncheon meats, 1 ounce; Egg, 1 medium; Tofu, 3 ounces\par Note: Three ounces of cooked meat is about the size of a deck of cards.\par Nuts, seeds, and legumes are rich sources of energy, magnesium, potassium, protein and fiber. Nuts and seeds are also high in fat, so portions should be small.\par Almonds, 1/3 cup; Beans such as kidney, monteiro, and navy, 1/2 cup cooked; Chickpeas and lentils, 1/2 cup cooked; Cashews, 1/3 cup; Filberts, 1/3 cup; Mixed nuts, 1/3 cup; Peanut butter, 2 tablespoons; Peanuts, 1/3 cup; Sesame seeds, 2 tablespoons; Sunflower seeds, 2 tablespoons; Tofu, regular, 3 ounces; Walnuts, 1/3 cup \par Following the above diet will give you about 27% fat in your diet. The goal is to have 30% or less of the calories you eat each day be from fat. To meet that goal, do not eat more than 2-3 servings daily of added fat. Also try to limit sweets. One serving equals:\par Butter or margarine, 1 teaspoon; Mayonnaise, 1 teaspoon; Low-fat mayonnaise, 1 tablespoon; Salad dressing, 1 tablespoon; Low-fat salad dressing, 2 tablespoons; Oil, 1 teaspoon (use olive, canola, safflower, or other vegetable oils); Candy, hard, 3 pieces; Jelly or jam, 1 tablespoon); Jell-O, 1/2 cup; Jelly beans, 1/2 ounce; Maple syrup, 1 tablespoon; Popsicle, 1; Sherbet or nonfat or low-fat frozen yogurt, 1/2 cup; Sugar, 1 tablespoon; Sugared lemonade or fruit punch, 1 cup (8 ounces); Note: Try diet fruit-flavored gelatin or frozen, canned, or fresh fruit for dessert.\par \par Small amounts of alcohol may have benefits to the heart and blood pressure. However, excess use of alcohol can cause damage to the brain, liver and other organs. It can lead to high blood pressure. Drinking more than two drinks (15 ml)  every day can raise your blood pressure. 15 ml of alcohol equals: \par • one 12-ounce bottle of beer \par • a half glass (5 ounces) of wine \par • 1 ounce (one shot) of 100 proof hard liquor\par \par NASAL SALINE WASHING:\par Nasal saline washing is useful during periods of sinusitis or nasal congestion (conditions which may cause excessive snoring and sleep apnea). I recommend twice daily nasal washing using Nasaline Irrigation System ™.  This may be purchased at low cost through [employer]. Nasal saline washing should be done twice daily, preferably at bedtime (to help sleeping) and after showering (since the steam from a shower helps with the process).  The basic  principles of nasal washing are as follows:\par \par 1. Mix ½ cup of table salt and ½ cup of baking soda, store this in a dry container with a lid. \par 2. Each time you need to make a saline solution, take ½ teaspoon of the dry mixture and mix it in a cup of warm water. \par 3. Using a soft rubber bulb syringe, fill the syringe with the saline solution and squeeze the bulb into one nostril and then the other. Lean over a sink and let the saline drip out. Gently blow your nose to remove mucus. Repeat the process again. \par 4. Now squeeze the bulb again into one nostril while sniffing the saline into the back of your nose and throat. Use moderate force when you squeeze the bulb. The saline will go back down your throat and may make you gag. Just clear your throat and spit it out. Do this in each nostril once or twice.  \par 5. When your nose is clear, gently blow and dry. You may now use nasal sprays if prescribed. \par 6. Clean the bulb syringe with mild bleach and water solution once a week. Be sure to rinse it well.  \par 7. If the saline solution stings or burns, change the amount of salt or baking soda in the dry mixture or the amount of water used to make the solution.\par

## 2022-04-15 NOTE — PHYSICAL EXAM
[Well Developed] : well developed [Well Nourished] : well nourished [No Acute Distress] : no acute distress [Normal Conjunctiva] : normal conjunctiva [Normal Venous Pressure] : normal venous pressure [No Carotid Bruit] : no carotid bruit [5th Left ICS - MCL] : palpated at the 5th LICS in the midclavicular line [Diffuse] : diffuse [No Precordial Heave] : no precordial heave was noted [Normal Rate] : normal [Rhythm Regular] : regular [Normal S1] : normal S1 [No Gallop] : no gallop heard [No Murmur] : no murmurs heard [No Pitting Edema] : no pitting edema present [2+] : left 2+ [No Abnormalities] : the abdominal aorta was not enlarged and no bruit was heard [Clear Lung Fields] : clear lung fields [Good Air Entry] : good air entry [No Respiratory Distress] : no respiratory distress  [Soft] : abdomen soft [Non Tender] : non-tender [No Masses/organomegaly] : no masses/organomegaly [Normal Bowel Sounds] : normal bowel sounds [Normal Gait] : normal gait [No Edema] : no edema [No Cyanosis] : no cyanosis [No Clubbing] : no clubbing [No Varicosities] : no varicosities [No Rash] : no rash [No Skin Lesions] : no skin lesions [Moves all extremities] : moves all extremities [No Focal Deficits] : no focal deficits [Normal Speech] : normal speech [Normal] : alert and oriented, normal memory [Alert and Oriented] : alert and oriented [Normal memory] : normal memory [Apical Thrill] : no thrill palpable at the apex [Click] : no click [Pericardial Rub] : no pericardial rub [Rt] : no varicose veins of the right leg [Lt] : no varicose veins of the left leg [Right Carotid Bruit] : no bruit heard over the right carotid [Left Carotid Bruit] : no bruit heard over the left carotid [Prolonged Exp Time] : expiratory time was not prolonged [Increased E/I Ratio] : no increase in the expiratory/inspiratory ratio  [Exp Wheezing] : no expiratory wheezing was heard [de-identified] : no oral crowding, no macroglossia, no observed anterior nasal septal deviation. Mallampatti class III with visualization of only the base of the uvula [de-identified] : thick neck structure; neck ciircumf 17.25" [de-identified] : during forced exhalation, there was wheeze noted left lung field [de-identified] : left ankle circumf 9.75 in; right ankle circumf 10.25 in. Calf circumf 18 in bilat

## 2022-04-15 NOTE — ADDENDUM
[FreeTextEntry1] : I spent 60 min face to face time with the patient from 11:00 to 12:00 on 4/15/22. Thirty min were devoted to patient counseling as outlined above in the End of Visit section. Prior to this visit, I reviewed records of Dr Ed Villafana and colleagues. Labs 1/3/22  were reviewed as were prior labs and tests. I called and obtained labs and records from office of nephrology, Dr. Feng.

## 2022-04-15 NOTE — CARDIOLOGY SUMMARY
[de-identified] : 4/15/22,  sinus bradycardia at 52 bpm, with normal ST-segments and T-waves. Prior ECG 12/01/2020 showed normal sinus rhythm at 70 bpm. There was voltage for LVH (Rayshawn criteria) and nonspecific ST depression. Rhythm / RR – interval analysis 4/15/22 observed 75 beats over 90 sec with mean HR 52 bpm; mean RR 1127 ms (8315-9430); Max/Min 117%; RR SD 46 and RR CV 4%. There were no PVCs or PACs [de-identified] : 10/27/14, Negative exercise EKG. No arrhythmias during exercise. Hypertensive response to exercise. Excellent exercise capacity at 13.1 mets. [de-identified] : 3/22/22, normal mitral valve with minimal regurgitation. The aortic valve was normal trileaflet with no regurgitation. The aortic root, ascending aorta and aortic arch appeared normal. The left atrium appeared normal, with volume index 30 cc/sqm. The LV was normal in size and wall thickness. There were no segmental wall motion abnormalities and overall systolic function was normal with visual estimated of EF 60 - 65%. LV diastolic function appeared normal. The right atrium appeared normal. The right ventricle was normal in size and function.  The tricuspid and pulmonic valves appeared normal, with minimal regurgitation of both valves. The pericardium appeared normal, with no effusion. Estimated RVSP 20 mm Hg, consistent with normal pulmonary pressures

## 2022-07-16 ENCOUNTER — RX RENEWAL (OUTPATIENT)
Age: 32
End: 2022-07-16

## 2022-10-21 ENCOUNTER — APPOINTMENT (OUTPATIENT)
Dept: CARDIOLOGY | Facility: CLINIC | Age: 32
End: 2022-10-21

## 2022-10-30 NOTE — HISTORY OF PRESENT ILLNESS
[FreeTextEntry1] : KATINA PARHAM  is a 32 year old  M\par \par PMH of abnormal EKG, HTN, heart failure, cardiomyopathy, CRI, JUAN on CPAP, and past tobacco use.\par \par In 2014 he presented to an urgent care with cough and uncontrolled BP. \par Transferred to the hospital and admitted to the ICU. \par There was LV and renal dysfunction. \par Told heart was enlarged. 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 \par Initially followed at [facility], started on medical therapy. \par Previously prescribed carvedilol, enalapril, hydralazine, isosorbide.\par Saw Jung, nephrology\par Saw Michelle, HTN\par \par he overall feels well \par he is working as manager \par denies chest pain, pressure, palpitations, unusual shortness of breath, orthopnea, LE edema, lightheadedness, dizziness, near syncope or syncope. \par \par Interim he reports being scheduled for kidney biopsy.  He also had his dose of atorvastatin increased.  \par January 2022 hemoglobin 14.8 creatinine 1.7 proteinuria \par \par Blood work April 2021 hemoglobin 15.8 creatinine 1.5 total cholesterol 231  TSH 1.8 BNP 14 magnesium 2.2 \par Labs 4/9/21: WBC 6.1, Hgb 15.8, HCT 45.8, plt 209, Gluv 82, BUN 22, Cr 1.47, Na 141, k 4.3, Ca 9.7, AST 33, ALT 66, Chol 231, Trigs 470, HDL 37, , A1C 5.6, TSH 1.840, CRP 7.68, BNP 15, , Mag 2.2.\par Echo 1/15/21: EF 60%. Mild MR. Ascending aorta 3.8 cm (borderline dilation). Mild LVH. Normal wall motion. Minimal TR. Minimal ID. Compared with echo 1/20/20, increase in LVH.\par EKG 12/1/20: SR at 70 bpm, ID interval 144 ms, QTc 379 ms, nonspecific ST-T wave abnormalities \par Renal US 6/2020 negative for СВЕТЛАНА\par \par \par HTN \par CKD proteinuria bx pending\par JUAN\par HL\par h/o HF, CMP\par \par Follow up echo\par \par emphasized medication adherence and compliance this appears to have improved\par home blood pressure monitoring \par increase hydralazine \par limit NSAIDs \par \par Sequential iterative approach to BP control\par Low salt diet\par Increase aerobic exercise regimen, weight loss.\par CPAP.\par \par F/u nephro and hypertension specialist  \par \par Close clinical follow-up.  \par Instructed to notify our office if blood pressure persistently elevated.\par \par Discussed red flag symptoms, which would warrant sooner or emergent medical evaluation.\par Any questions and concerns were addressed and resolved.\par

## 2022-11-04 ENCOUNTER — APPOINTMENT (OUTPATIENT)
Dept: CARDIOLOGY | Facility: CLINIC | Age: 32
End: 2022-11-04

## 2022-11-26 ENCOUNTER — RX RENEWAL (OUTPATIENT)
Age: 32
End: 2022-11-26

## 2023-02-03 ENCOUNTER — APPOINTMENT (OUTPATIENT)
Dept: CARDIOLOGY | Facility: CLINIC | Age: 33
End: 2023-02-03
Payer: COMMERCIAL

## 2023-02-03 VITALS
SYSTOLIC BLOOD PRESSURE: 156 MMHG | HEART RATE: 77 BPM | BODY MASS INDEX: 33.11 KG/M2 | WEIGHT: 258 LBS | OXYGEN SATURATION: 98 % | DIASTOLIC BLOOD PRESSURE: 80 MMHG | HEIGHT: 74 IN | TEMPERATURE: 97.1 F

## 2023-02-03 DIAGNOSIS — G47.33 OBSTRUCTIVE SLEEP APNEA (ADULT) (PEDIATRIC): ICD-10-CM

## 2023-02-03 PROCEDURE — 99214 OFFICE O/P EST MOD 30 MIN: CPT

## 2023-02-03 PROCEDURE — 99072 ADDL SUPL MATRL&STAF TM PHE: CPT

## 2023-02-03 RX ORDER — LOSARTAN POTASSIUM 25 MG/1
25 TABLET, FILM COATED ORAL
Qty: 180 | Refills: 3 | Status: DISCONTINUED | COMMUNITY
Start: 2020-12-01 | End: 2023-02-03

## 2023-08-11 ENCOUNTER — RX RENEWAL (OUTPATIENT)
Age: 33
End: 2023-08-11

## 2023-10-24 ENCOUNTER — NON-APPOINTMENT (OUTPATIENT)
Age: 33
End: 2023-10-24

## 2023-10-24 ENCOUNTER — APPOINTMENT (OUTPATIENT)
Dept: CARDIOLOGY | Facility: CLINIC | Age: 33
End: 2023-10-24
Payer: COMMERCIAL

## 2023-10-24 VITALS
BODY MASS INDEX: 33.62 KG/M2 | HEART RATE: 90 BPM | HEIGHT: 74 IN | SYSTOLIC BLOOD PRESSURE: 118 MMHG | DIASTOLIC BLOOD PRESSURE: 96 MMHG | WEIGHT: 262 LBS | OXYGEN SATURATION: 96 %

## 2023-10-24 DIAGNOSIS — I13.0 HYPERTENSIVE HEART AND CHRONIC KIDNEY DISEASE WITH HEART FAILURE AND STAGE 1 THROUGH STAGE 4 CHRONIC KIDNEY DISEASE, OR UNSPECIFIED CHRONIC KIDNEY DISEASE: ICD-10-CM

## 2023-10-24 DIAGNOSIS — I42.9 CARDIOMYOPATHY, UNSPECIFIED: ICD-10-CM

## 2023-10-24 DIAGNOSIS — E78.2 MIXED HYPERLIPIDEMIA: ICD-10-CM

## 2023-10-24 PROCEDURE — 99214 OFFICE O/P EST MOD 30 MIN: CPT | Mod: 25

## 2023-10-24 PROCEDURE — 93000 ELECTROCARDIOGRAM COMPLETE: CPT

## 2023-10-24 RX ORDER — ATORVASTATIN CALCIUM 80 MG/1
80 TABLET, FILM COATED ORAL
Qty: 90 | Refills: 0 | Status: DISCONTINUED | COMMUNITY
Start: 2023-08-08 | End: 2023-10-24

## 2023-10-24 RX ORDER — DILTIAZEM HYDROCHLORIDE 180 MG/1
180 CAPSULE, EXTENDED RELEASE ORAL
Qty: 180 | Refills: 1 | Status: DISCONTINUED | COMMUNITY
Start: 2020-08-11 | End: 2023-10-24

## 2024-04-12 ENCOUNTER — APPOINTMENT (OUTPATIENT)
Dept: CARDIOLOGY | Facility: CLINIC | Age: 34
End: 2024-04-12

## 2024-04-15 ENCOUNTER — APPOINTMENT (OUTPATIENT)
Dept: CARDIOLOGY | Facility: CLINIC | Age: 34
End: 2024-04-15

## 2024-04-23 ENCOUNTER — APPOINTMENT (OUTPATIENT)
Dept: CARDIOLOGY | Facility: CLINIC | Age: 34
End: 2024-04-23

## 2024-05-02 ENCOUNTER — RX RENEWAL (OUTPATIENT)
Age: 34
End: 2024-05-02

## 2024-05-06 ENCOUNTER — RX RENEWAL (OUTPATIENT)
Age: 34
End: 2024-05-06

## 2024-05-29 ENCOUNTER — RX RENEWAL (OUTPATIENT)
Age: 34
End: 2024-05-29

## 2024-06-15 ENCOUNTER — RX RENEWAL (OUTPATIENT)
Age: 34
End: 2024-06-15

## 2024-07-03 ENCOUNTER — RX RENEWAL (OUTPATIENT)
Age: 34
End: 2024-07-03

## 2024-07-05 ENCOUNTER — APPOINTMENT (OUTPATIENT)
Dept: CARDIOLOGY | Facility: CLINIC | Age: 34
End: 2024-07-05
Payer: COMMERCIAL

## 2024-07-05 PROCEDURE — 93306 TTE W/DOPPLER COMPLETE: CPT

## 2024-07-12 ENCOUNTER — APPOINTMENT (OUTPATIENT)
Dept: CARDIOLOGY | Facility: CLINIC | Age: 34
End: 2024-07-12
Payer: COMMERCIAL

## 2024-07-12 VITALS
DIASTOLIC BLOOD PRESSURE: 120 MMHG | HEART RATE: 86 BPM | WEIGHT: 236 LBS | BODY MASS INDEX: 30.29 KG/M2 | SYSTOLIC BLOOD PRESSURE: 170 MMHG | HEIGHT: 74 IN | OXYGEN SATURATION: 97 %

## 2024-07-12 DIAGNOSIS — R79.82 ELEVATED C-REACTIVE PROTEIN (CRP): ICD-10-CM

## 2024-07-12 DIAGNOSIS — I42.9 CARDIOMYOPATHY, UNSPECIFIED: ICD-10-CM

## 2024-07-12 DIAGNOSIS — I10 ESSENTIAL (PRIMARY) HYPERTENSION: ICD-10-CM

## 2024-07-12 DIAGNOSIS — I13.0 HYPERTENSIVE HEART AND CHRONIC KIDNEY DISEASE WITH HEART FAILURE AND STAGE 1 THROUGH STAGE 4 CHRONIC KIDNEY DISEASE, OR UNSPECIFIED CHRONIC KIDNEY DISEASE: ICD-10-CM

## 2024-07-12 DIAGNOSIS — G47.33 OBSTRUCTIVE SLEEP APNEA (ADULT) (PEDIATRIC): ICD-10-CM

## 2024-07-12 DIAGNOSIS — I50.9 HEART FAILURE, UNSPECIFIED: ICD-10-CM

## 2024-07-12 DIAGNOSIS — E66.9 OBESITY, UNSPECIFIED: ICD-10-CM

## 2024-07-12 DIAGNOSIS — E78.2 MIXED HYPERLIPIDEMIA: ICD-10-CM

## 2024-07-12 PROCEDURE — 99204 OFFICE O/P NEW MOD 45 MIN: CPT

## 2024-08-02 ENCOUNTER — APPOINTMENT (OUTPATIENT)
Dept: CARDIOLOGY | Facility: CLINIC | Age: 34
End: 2024-08-02

## 2024-08-03 ENCOUNTER — RX CHANGE (OUTPATIENT)
Age: 34
End: 2024-08-03

## 2024-11-29 ENCOUNTER — RX RENEWAL (OUTPATIENT)
Age: 34
End: 2024-11-29

## 2024-12-04 ENCOUNTER — NON-APPOINTMENT (OUTPATIENT)
Age: 34
End: 2024-12-04

## 2025-01-08 ENCOUNTER — NON-APPOINTMENT (OUTPATIENT)
Age: 35
End: 2025-01-08

## 2025-01-10 ENCOUNTER — RX RENEWAL (OUTPATIENT)
Age: 35
End: 2025-01-10

## 2025-01-21 ENCOUNTER — APPOINTMENT (OUTPATIENT)
Dept: CARDIOLOGY | Facility: CLINIC | Age: 35
End: 2025-01-21

## 2025-02-07 ENCOUNTER — RX RENEWAL (OUTPATIENT)
Age: 35
End: 2025-02-07

## 2025-02-18 ENCOUNTER — APPOINTMENT (OUTPATIENT)
Dept: CARDIOLOGY | Facility: CLINIC | Age: 35
End: 2025-02-18
Payer: COMMERCIAL

## 2025-02-18 ENCOUNTER — NON-APPOINTMENT (OUTPATIENT)
Age: 35
End: 2025-02-18

## 2025-02-18 VITALS
HEART RATE: 63 BPM | BODY MASS INDEX: 32.36 KG/M2 | OXYGEN SATURATION: 95 % | WEIGHT: 252 LBS | DIASTOLIC BLOOD PRESSURE: 92 MMHG | SYSTOLIC BLOOD PRESSURE: 144 MMHG

## 2025-02-18 DIAGNOSIS — I50.9 HEART FAILURE, UNSPECIFIED: ICD-10-CM

## 2025-02-18 DIAGNOSIS — R94.31 ABNORMAL ELECTROCARDIOGRAM [ECG] [EKG]: ICD-10-CM

## 2025-02-18 DIAGNOSIS — E66.812 OBESITY, CLASS 2: ICD-10-CM

## 2025-02-18 DIAGNOSIS — I42.9 CARDIOMYOPATHY, UNSPECIFIED: ICD-10-CM

## 2025-02-18 DIAGNOSIS — I13.0 HYPERTENSIVE HEART AND CHRONIC KIDNEY DISEASE WITH HEART FAILURE AND STAGE 1 THROUGH STAGE 4 CHRONIC KIDNEY DISEASE, OR UNSPECIFIED CHRONIC KIDNEY DISEASE: ICD-10-CM

## 2025-02-18 DIAGNOSIS — R79.82 ELEVATED C-REACTIVE PROTEIN (CRP): ICD-10-CM

## 2025-02-18 DIAGNOSIS — E78.2 MIXED HYPERLIPIDEMIA: ICD-10-CM

## 2025-02-18 PROCEDURE — 93000 ELECTROCARDIOGRAM COMPLETE: CPT

## 2025-02-18 PROCEDURE — 99214 OFFICE O/P EST MOD 30 MIN: CPT

## 2025-02-18 RX ORDER — SEMAGLUTIDE 0.25 MG/.5ML
0.25 INJECTION, SOLUTION SUBCUTANEOUS
Qty: 1 | Refills: 0 | Status: ACTIVE | COMMUNITY
Start: 2025-02-18 | End: 1900-01-01

## 2025-03-12 ENCOUNTER — APPOINTMENT (OUTPATIENT)
Dept: CARDIOLOGY | Facility: CLINIC | Age: 35
End: 2025-03-12
Payer: COMMERCIAL

## 2025-03-12 PROCEDURE — 93351 STRESS TTE COMPLETE: CPT

## 2025-03-12 PROCEDURE — 93320 DOPPLER ECHO COMPLETE: CPT

## 2025-03-18 ENCOUNTER — APPOINTMENT (OUTPATIENT)
Dept: CARDIOLOGY | Facility: CLINIC | Age: 35
End: 2025-03-18
Payer: COMMERCIAL

## 2025-03-18 VITALS
BODY MASS INDEX: 32.74 KG/M2 | WEIGHT: 255 LBS | OXYGEN SATURATION: 93 % | HEART RATE: 76 BPM | SYSTOLIC BLOOD PRESSURE: 132 MMHG | DIASTOLIC BLOOD PRESSURE: 90 MMHG

## 2025-03-18 DIAGNOSIS — G47.33 OBSTRUCTIVE SLEEP APNEA (ADULT) (PEDIATRIC): ICD-10-CM

## 2025-03-18 DIAGNOSIS — R79.82 ELEVATED C-REACTIVE PROTEIN (CRP): ICD-10-CM

## 2025-03-18 DIAGNOSIS — I13.0 HYPERTENSIVE HEART AND CHRONIC KIDNEY DISEASE WITH HEART FAILURE AND STAGE 1 THROUGH STAGE 4 CHRONIC KIDNEY DISEASE, OR UNSPECIFIED CHRONIC KIDNEY DISEASE: ICD-10-CM

## 2025-03-18 DIAGNOSIS — R94.31 ABNORMAL ELECTROCARDIOGRAM [ECG] [EKG]: ICD-10-CM

## 2025-03-18 DIAGNOSIS — I10 ESSENTIAL (PRIMARY) HYPERTENSION: ICD-10-CM

## 2025-03-18 DIAGNOSIS — I50.9 HEART FAILURE, UNSPECIFIED: ICD-10-CM

## 2025-03-18 DIAGNOSIS — E66.812 OBESITY, CLASS 2: ICD-10-CM

## 2025-03-18 DIAGNOSIS — I12.9 HYPERTENSIVE CHRONIC KIDNEY DISEASE WITH STAGE 1 THROUGH STAGE 4 CHRONIC KIDNEY DISEASE, OR UNSPECIFIED CHRONIC KIDNEY DISEASE: ICD-10-CM

## 2025-03-18 DIAGNOSIS — E78.2 MIXED HYPERLIPIDEMIA: ICD-10-CM

## 2025-03-18 DIAGNOSIS — I42.9 CARDIOMYOPATHY, UNSPECIFIED: ICD-10-CM

## 2025-03-18 PROCEDURE — 99214 OFFICE O/P EST MOD 30 MIN: CPT

## 2025-03-18 PROCEDURE — 99205 OFFICE O/P NEW HI 60 MIN: CPT

## 2025-03-21 LAB — HBA1C MFR BLD HPLC: 5.8

## 2025-03-31 ENCOUNTER — NON-APPOINTMENT (OUTPATIENT)
Age: 35
End: 2025-03-31

## 2025-04-03 ENCOUNTER — APPOINTMENT (OUTPATIENT)
Dept: ULTRASOUND IMAGING | Facility: CLINIC | Age: 35
End: 2025-04-03
Payer: COMMERCIAL

## 2025-04-03 PROCEDURE — 93975 VASCULAR STUDY: CPT

## 2025-04-06 ENCOUNTER — NON-APPOINTMENT (OUTPATIENT)
Age: 35
End: 2025-04-06

## 2025-04-07 ENCOUNTER — NON-APPOINTMENT (OUTPATIENT)
Age: 35
End: 2025-04-07

## 2025-05-07 NOTE — H&P PST ADULT - ASSESSMENT
35 yo male for renal artery denervation.    ASA  Mallampati  GFR  Creat  Bleeding Risk  Pt assessed, appropriate for sedation, pt. educated regarding the plan for Versed/Fentanyl as needed.     Plan: PRE-PROCEDURE ASSESSMENT    -Patient seen and examined  -Plan Renal Artery denervation via ****  PRE-PROCEDURE ASSESSMENT  -NPO after midnight confirmed  -IV insert  -Patient seen and examined  -Labs and EKG reviewed  -Normal Saline bolus 250 cc iV for renal protection****  -Pre-procedure teaching completed with patient   -Procedure discussed with patient, risks and benefits explained, questions answered  -consent obtained by attending IC  -Questions answered    Risks and benefits of procedure and sedation and risks and benefits for the alternative therapy have been explained to the patient in layman's terms including but not limited to: allergic reaction, bleeding, infection,  respiratory compromise, renal and vascular compromise, limb damage, .  Informed consent to be obtained from interventionalist.     33 yo male for renal artery denervation.    ASA 3  Mallampati 3  GFR 45  Creat 1.96  Bleeding Risk 0.8%  Pt assessed, appropriate for sedation, pt. educated regarding the plan for Versed/Fentanyl as needed.     Plan: PRE-PROCEDURE ASSESSMENT    -Patient seen and examined  -Plan Renal Artery denervation via ****  PRE-PROCEDURE ASSESSMENT  -NPO after midnight confirmed  -IV insert  -Patient seen and examined  -Labs and EKG reviewed  -Normal Saline bolus 500 cc iV for renal protection  -ASA 81mg PO preprocedure  -Pre-procedure teaching completed with patient   -Procedure discussed with patient, risks and benefits explained, questions answered  -consent obtained by attending IC  -Questions answered    Risks and benefits of procedure and sedation and risks and benefits for the alternative therapy have been explained to the patient in layman's terms including but not limited to: allergic reaction, bleeding, infection,  respiratory compromise, renal and vascular compromise, limb damage, .  Informed consent to be obtained from interventionalist.

## 2025-05-07 NOTE — H&P PST ADULT - GENITOURINARY
[Follow-Up Visit] : a follow-up visit for [Initial Visit] : an initial visit for [Other: ____] : [unfilled] [FreeTextEntry2] : seen for an initial visit for left clavicle fx negative

## 2025-05-07 NOTE — H&P PST ADULT - HISTORY OF PRESENT ILLNESS
Narrative: This is a 33 yo male with PMHx significant for premature HTN since his early 20s without concrete secondary candidacy. He is on CPAP therapy for sleep apnea for the past 10 years, has CKD with biopsy--proven hypertensive nephrosclerosis in 2022. He is on 5 antihypertensive medications and has been ruled out for hyperaldosteronism, pheochromocytoma, renal artery stenosis, fibromuscular dysplasia and his JUAN is on CPAP therapy.  His GFR is > 30-44 and will be hydrated aggressively 2 days prior and 1 day post procedure for renal protection against AUGUSTA.        Stress Echo: 3/12/25  Resting EF 60%  Normal augmentatin of LVEF and wall motion immediate post stress.  No Evidence of exercise induced ischemia by EKG and stress Echo with sub max HR   Protocol: Brruce  METS achieved: 12.1  Stage reached: 4  Peak HR: 150 (81% of predicted max)  HR response: Normal  Blood Pressure: Rest 128/90 stress 160/90  Capacity: Average

## 2025-05-07 NOTE — H&P PST ADULT - NSICDXPASTMEDICALHX_GEN_ALL_CORE_FT
PAST MEDICAL HISTORY:  Chronic kidney disease, unspecified CKD stage     Elevated C-reactive protein (CRP)     H/O cardiomyopathy     HLD (hyperlipidemia)     Hypertensive nephrosclerosis     Obesity, Class II, BMI 35-39.9     JUAN on CPAP     Past use of tobacco     Primary pulmonary HTN

## 2025-05-08 ENCOUNTER — TRANSCRIPTION ENCOUNTER (OUTPATIENT)
Age: 35
End: 2025-05-08

## 2025-05-08 ENCOUNTER — OUTPATIENT (OUTPATIENT)
Dept: OUTPATIENT SERVICES | Facility: HOSPITAL | Age: 35
LOS: 1 days | End: 2025-05-08
Payer: COMMERCIAL

## 2025-05-08 ENCOUNTER — NON-APPOINTMENT (OUTPATIENT)
Age: 35
End: 2025-05-08

## 2025-05-08 VITALS
SYSTOLIC BLOOD PRESSURE: 155 MMHG | DIASTOLIC BLOOD PRESSURE: 99 MMHG | RESPIRATION RATE: 18 BRPM | HEART RATE: 92 BPM | OXYGEN SATURATION: 98 % | TEMPERATURE: 98 F | HEIGHT: 74 IN | WEIGHT: 250 LBS

## 2025-05-08 VITALS
RESPIRATION RATE: 17 BRPM | HEART RATE: 76 BPM | SYSTOLIC BLOOD PRESSURE: 125 MMHG | DIASTOLIC BLOOD PRESSURE: 90 MMHG | OXYGEN SATURATION: 98 %

## 2025-05-08 DIAGNOSIS — I13.0 HYPERTENSIVE HEART AND CHRONIC KIDNEY DISEASE WITH HEART FAILURE AND STAGE 1 THROUGH STAGE 4 CHRONIC KIDNEY DISEASE, OR UNSPECIFIED CHRONIC KIDNEY DISEASE: ICD-10-CM

## 2025-05-08 DIAGNOSIS — Z98.890 OTHER SPECIFIED POSTPROCEDURAL STATES: Chronic | ICD-10-CM

## 2025-05-08 LAB
ANION GAP SERPL CALC-SCNC: 12 MMOL/L — SIGNIFICANT CHANGE UP (ref 5–17)
BASOPHILS # BLD AUTO: 0.07 K/UL — SIGNIFICANT CHANGE UP (ref 0–0.2)
BASOPHILS NFR BLD AUTO: 1.1 % — SIGNIFICANT CHANGE UP (ref 0–2)
BUN SERPL-MCNC: 26.7 MG/DL — HIGH (ref 8–20)
CALCIUM SERPL-MCNC: 9.3 MG/DL — SIGNIFICANT CHANGE UP (ref 8.4–10.5)
CHLORIDE SERPL-SCNC: 102 MMOL/L — SIGNIFICANT CHANGE UP (ref 96–108)
CO2 SERPL-SCNC: 24 MMOL/L — SIGNIFICANT CHANGE UP (ref 22–29)
CREAT SERPL-MCNC: 1.96 MG/DL — HIGH (ref 0.5–1.3)
EGFR: 45 ML/MIN/1.73M2 — LOW
EGFR: 45 ML/MIN/1.73M2 — LOW
EOSINOPHIL # BLD AUTO: 0.51 K/UL — HIGH (ref 0–0.5)
EOSINOPHIL NFR BLD AUTO: 7.8 % — HIGH (ref 0–6)
GLUCOSE SERPL-MCNC: 98 MG/DL — SIGNIFICANT CHANGE UP (ref 70–99)
HCT VFR BLD CALC: 44.2 % — SIGNIFICANT CHANGE UP (ref 39–50)
HGB BLD-MCNC: 15 G/DL — SIGNIFICANT CHANGE UP (ref 13–17)
IMM GRANULOCYTES # BLD AUTO: 0.03 K/UL — SIGNIFICANT CHANGE UP (ref 0–0.07)
IMM GRANULOCYTES NFR BLD AUTO: 0.5 % — SIGNIFICANT CHANGE UP (ref 0–0.9)
LYMPHOCYTES # BLD AUTO: 2.57 K/UL — SIGNIFICANT CHANGE UP (ref 1–3.3)
LYMPHOCYTES NFR BLD AUTO: 39.5 % — SIGNIFICANT CHANGE UP (ref 13–44)
MAGNESIUM SERPL-MCNC: 2.3 MG/DL — SIGNIFICANT CHANGE UP (ref 1.6–2.6)
MCHC RBC-ENTMCNC: 30.8 PG — SIGNIFICANT CHANGE UP (ref 27–34)
MCHC RBC-ENTMCNC: 33.9 G/DL — SIGNIFICANT CHANGE UP (ref 32–36)
MCV RBC AUTO: 90.8 FL — SIGNIFICANT CHANGE UP (ref 80–100)
MONOCYTES # BLD AUTO: 0.72 K/UL — SIGNIFICANT CHANGE UP (ref 0–0.9)
MONOCYTES NFR BLD AUTO: 11.1 % — SIGNIFICANT CHANGE UP (ref 2–14)
NEUTROPHILS # BLD AUTO: 2.6 K/UL — SIGNIFICANT CHANGE UP (ref 1.8–7.4)
NEUTROPHILS NFR BLD AUTO: 40 % — LOW (ref 43–77)
NRBC # BLD AUTO: 0 K/UL — SIGNIFICANT CHANGE UP (ref 0–0)
NRBC # FLD: 0 K/UL — SIGNIFICANT CHANGE UP (ref 0–0)
NRBC BLD AUTO-RTO: 0 /100 WBCS — SIGNIFICANT CHANGE UP (ref 0–0)
PHOSPHATE SERPL-MCNC: 4.3 MG/DL — SIGNIFICANT CHANGE UP (ref 2.4–4.7)
PLATELET # BLD AUTO: 198 K/UL — SIGNIFICANT CHANGE UP (ref 150–400)
PMV BLD: 8.7 FL — SIGNIFICANT CHANGE UP (ref 7–13)
POTASSIUM SERPL-MCNC: 3.9 MMOL/L — SIGNIFICANT CHANGE UP (ref 3.5–5.3)
POTASSIUM SERPL-SCNC: 3.9 MMOL/L — SIGNIFICANT CHANGE UP (ref 3.5–5.3)
RBC # BLD: 4.87 M/UL — SIGNIFICANT CHANGE UP (ref 4.2–5.8)
RBC # FLD: 12.6 % — SIGNIFICANT CHANGE UP (ref 10.3–14.5)
SODIUM SERPL-SCNC: 138 MMOL/L — SIGNIFICANT CHANGE UP (ref 135–145)
WBC # BLD: 6.5 K/UL — SIGNIFICANT CHANGE UP (ref 3.8–10.5)
WBC # FLD AUTO: 6.5 K/UL — SIGNIFICANT CHANGE UP (ref 3.8–10.5)

## 2025-05-08 PROCEDURE — 80048 BASIC METABOLIC PNL TOTAL CA: CPT

## 2025-05-08 PROCEDURE — C1894: CPT

## 2025-05-08 PROCEDURE — 84100 ASSAY OF PHOSPHORUS: CPT

## 2025-05-08 PROCEDURE — 36415 COLL VENOUS BLD VENIPUNCTURE: CPT

## 2025-05-08 PROCEDURE — C1887: CPT

## 2025-05-08 PROCEDURE — 93005 ELECTROCARDIOGRAM TRACING: CPT

## 2025-05-08 PROCEDURE — 93010 ELECTROCARDIOGRAM REPORT: CPT

## 2025-05-08 PROCEDURE — 0339T TRNSCTH RENAL SYMP DENRV BIL: CPT

## 2025-05-08 PROCEDURE — 85025 COMPLETE CBC W/AUTO DIFF WBC: CPT

## 2025-05-08 PROCEDURE — C1769: CPT

## 2025-05-08 PROCEDURE — 99152 MOD SED SAME PHYS/QHP 5/>YRS: CPT

## 2025-05-08 PROCEDURE — C1760: CPT

## 2025-05-08 PROCEDURE — 83735 ASSAY OF MAGNESIUM: CPT

## 2025-05-08 PROCEDURE — C1735: CPT

## 2025-05-08 RX ORDER — ASPIRIN 325 MG
81 TABLET ORAL ONCE
Refills: 0 | Status: COMPLETED | OUTPATIENT
Start: 2025-05-08 | End: 2025-05-08

## 2025-05-08 RX ORDER — CARVEDILOL 3.12 MG/1
25 TABLET, FILM COATED ORAL ONCE
Refills: 0 | Status: COMPLETED | OUTPATIENT
Start: 2025-05-08 | End: 2025-05-08

## 2025-05-08 RX ADMIN — Medication 500 MILLILITER(S): at 11:06

## 2025-05-08 RX ADMIN — Medication 500 MILLILITER(S): at 08:21

## 2025-05-08 RX ADMIN — CARVEDILOL 25 MILLIGRAM(S): 3.12 TABLET, FILM COATED ORAL at 11:06

## 2025-05-08 RX ADMIN — Medication 200 MILLILITER(S): at 11:08

## 2025-05-08 RX ADMIN — Medication 81 MILLIGRAM(S): at 11:06

## 2025-05-08 NOTE — DISCHARGE NOTE PROVIDER - NSDCQMSTROKE_NEU_ALL_CORE
I ordered Euflexxa injections for you.    Recommend that you continue with physical therapy sessions.    ~Please call Nurse Navigation line [phone] with any questions or concerns about your treatment plan, if symptoms worsen and you would like to be seen urgently, or if you have problems controlling bladder and bowel function.         No

## 2025-05-08 NOTE — DISCHARGE NOTE PROVIDER - CARE PROVIDER_API CALL
Waldemar Tracy)  Cardiovascular Disease  [address] [place] [id number]  Phone: [phone]  Fax: [phone]  Established Patient  Follow Up Time: 1 week

## 2025-05-08 NOTE — DISCHARGE NOTE PROVIDER - NSDCCPCAREPLAN_GEN_ALL_CORE_FT
PRINCIPAL DISCHARGE DIAGNOSIS  Diagnosis: Hypertensive nephrosclerosis  Assessment and Plan of Treatment: Renal devernation

## 2025-05-08 NOTE — DISCHARGE NOTE PROVIDER - NSDCMRMEDTOKEN_GEN_ALL_CORE_FT
amLODIPine 5 mg oral tablet: 1 tab(s) orally once a day (at bedtime)  atorvastatin 80 mg oral tablet: 1 tab(s) orally once a day (at bedtime)  carvedilol 25 mg oral tablet: 1 tab(s) orally 2 times a day  hydrALAZINE 100 mg oral tablet: 1 tab(s) orally 3 times a day  losartan 25 mg oral tablet: 1 tab(s) orally once a day  Omega-3 oral capsule: 1 tab(s) orally 2 times a day

## 2025-05-08 NOTE — DISCHARGE NOTE NURSING/CASE MANAGEMENT/SOCIAL WORK - FINANCIAL ASSISTANCE
[facility] provides services at a reduced cost to those who are determined to be eligible through [facility]’s financial assistance program. Information regarding [facility]’s financial assistance program can be found by going to [email or web address] or by calling [phone].

## 2025-05-08 NOTE — PROGRESS NOTE ADULT - SUBJECTIVE AND OBJECTIVE BOX
Interventional Cardiology NP post procedure note:     -s/p renal artery denervation with 39 ablations via RFA with Dr. Tracy (prelim report; official report to follow)    TELE: NSR 60s    MEDICATIONS  (STANDING):  chlorhexidine 4% Liquid 1 Application(s) Topical Once  sodium chloride 0.9%. 1000 milliLiter(s) (200 mL/Hr) IV Continuous <Continuous>      Allergies:  penicillin (Unknown)  amoxicillin (Rash)      PAST MEDICAL & SURGICAL HISTORY:  Primary pulmonary HTN      JUAN on CPAP      Obesity, Class II, BMI 35-39.9      HLD (hyperlipidemia)      Elevated C-reactive protein (CRP)      H/O cardiomyopathy      Past use of tobacco      Chronic kidney disease, unspecified CKD stage      Hypertensive nephrosclerosis      H/O wrist surgery          Vital Signs Last 24 Hrs  T(C): 36.7 (08 May 2025 07:45), Max: 36.7 (08 May 2025 07:45)  T(F): 98 (08 May 2025 07:45), Max: 98 (08 May 2025 07:45)  HR: 76 (08 May 2025 10:05) (76 - 92)  BP: 122/82 (08 May 2025 11:05) (111/82 - 155/99)  BP(mean): --  RR: 18 (08 May 2025 10:05) (18 - 18)  SpO2: 97% (08 May 2025 10:05) (97% - 98%)    Parameters below as of 08 May 2025 10:05  Patient On (Oxygen Delivery Method): room air        Physical Exam:  Constitutional: NAD, AAOx3  Cardiovascular: +S1S2 RRR  Pulmonary: CTA b/l, unlabored  GI: soft NTND +BS  Extremities: no pedal edema, +distal pulses b/l  Neuro: non focal, COSTA x4  Procedure site: RFA angioseal site benign without hematoma/bleeding; no bruit; + right PP    LABS:                        15.0   6.50  )-----------( 198      ( 08 May 2025 07:30 )             44.2     05-08    138  |  102  |  26.7[H]  ----------------------------<  98  3.9   |  24.0  |  1.96[H]    Ca    9.3      08 May 2025 07:30  Phos  4.3     05-08  Mg     2.3     05-08        Urinalysis Basic - ( 08 May 2025 07:30 )    Color: x / Appearance: x / SG: x / pH: x  Gluc: 98 mg/dL / Ketone: x  / Bili: x / Urobili: x   Blood: x / Protein: x / Nitrite: x   Leuk Esterase: x / RBC: x / WBC x   Sq Epi: x / Non Sq Epi: x / Bacteria: x        RADIOLOGY & ADDITIONAL TESTS:  
Yes

## 2025-05-08 NOTE — PROGRESS NOTE ADULT - ASSESSMENT
This is a 35 yo male with PMHx significant for premature HTN since his early 20s without concrete secondary candidacy. He is on CPAP therapy for sleep apnea for the past 10 years, has CKD with biopsy--proven hypertensive nephrosclerosis in 2022. He is on 5 antihypertensive medications and has been ruled out for hyperaldosteronism, pheochromocytoma, renal artery stenosis, fibromuscular dysplasia and his JUAN is on CPAP therapy.  His GFR is > 30-44 and will be hydrated aggressively 2 days prior and 1 day post procedure for renal protection against AUGUSTA.  Now s/p renal artery denervation with 39 ablations via RFA with Dr. Tracy (prelim report; official report to follow).      -Post cardiac cath orders  -Groin precautions  -Bedrest with HOB < 30 degrees x 1 hours post procedure then may sit up; OOB 2 hours post procedure  -Plan for discharge to home today at 330pm if remains stable  -NS 0.9% 200ml/hr x 5 hours post procedure AUGUSTA ppx secondary to CKD in setting of renal denervation procedure  -Continue current medical therapy including statin, beta blocker and other antihypertensive meds with plan for adjustments to these meds in the office setting  -Follow up outpt next week with Dr. Tracy for site and BP check  -Lifestyle modifications discussed to reduce cardiovascular risk factors including weight reduction, smoking cessation, medication compliance, and routine follow up with Cardiologist to track your BMI, cholesterol, and glucose levels.   -Discussed with Dr. Tracy

## 2025-05-08 NOTE — DISCHARGE NOTE NURSING/CASE MANAGEMENT/SOCIAL WORK - PATIENT PORTAL LINK FT
You can access the FollowMyHealth Patient Portal offered by [facility] by registering at the following website: [email or web address]. By joining [employer]’s FollowMyHealth portal, you will also be able to view your health information using other applications (apps) compatible with our system.

## 2025-05-08 NOTE — DISCHARGE NOTE PROVIDER - NSDCCPTREATMENT_GEN_ALL_CORE_FT
PRINCIPAL PROCEDURE  Procedure: Angiogram, peripheral, with PTA if indicated  Findings and Treatment: No heavy lifting, driving, sex, tub baths, swimming, or any activity that submerges the lower half of the body in water for 48 hours.  Limited walking and stairs for 48 hours.    Change the bandaid after 24 hours and every 24 hours after that.  Keep the puncture site dry and covered with a bandaid until a scab forms.    Observe the site frequently.  If bleeding or a large lump (the size of a golf ball or bigger) occurs lie flat, apply continuous direct pressure just above the puncture site for at least 10 minutes, and notify your physician immediately.  If the bleeding cannot be controlled, call 911 immediately for assistance.  Notify your physician of pain, swelling or any drainage.    Notify your physician immediately if coldness, numbness, discoloration or pain in your foot occurs.

## 2025-05-08 NOTE — DISCHARGE NOTE PROVIDER - HOSPITAL COURSE
33 yo male with PMHx significant for premature HTN since his early 20s without concrete secondary candidacy. He is on CPAP therapy for sleep apnea for the past 10 years, has CKD with biopsy--proven hypertensive nephrosclerosis in 2022. He is on 5 antihypertensive medications and has been ruled out for hyperaldosteronism, pheochromocytoma, renal artery stenosis, fibromuscular dysplasia and his JUAN is on CPAP therapy.  His GFR is > 30-44 and will be hydrated aggressively 2 days prior and 1 day post procedure for renal protection against AUGUSTA.  Now s/p renal artery denervation with 39 ablations via RFA with Dr. Tracy (prelim report; official report to follow).      -Post cardiac cath orders  -Groin precautions  -Bedrest with HOB < 30 degrees x 1 hours post procedure then may sit up; OOB 2 hours post procedure  -Plan for discharge to home today at 330pm if remains stable  -NS 0.9% 200ml/hr x 5 hours post procedure AUGUSTA ppx secondary to CKD in setting of renal denervation procedure  -Continue current medical therapy including statin, beta blocker and other antihypertensive meds with plan for adjustments to these meds in the office setting  -Follow up outpt next week with Dr. Tracy for site and BP check  -Lifestyle modifications discussed to reduce cardiovascular risk factors including weight reduction, smoking cessation, medication compliance, and routine follow up with Cardiologist to track your BMI, cholesterol, and glucose levels.   -Discussed with Dr. Tracy

## 2025-05-10 ENCOUNTER — NON-APPOINTMENT (OUTPATIENT)
Age: 35
End: 2025-05-10

## 2025-05-13 RX ORDER — OMEGA-3-ACID ETHYL ESTERS CAPSULES 1 G/1
1 CAPSULE, LIQUID FILLED ORAL
Refills: 0 | DISCHARGE

## 2025-05-13 RX ORDER — LOSARTAN POTASSIUM 100 MG/1
1 TABLET, FILM COATED ORAL
Refills: 0 | DISCHARGE

## 2025-05-13 RX ORDER — CARVEDILOL 3.12 MG/1
1 TABLET, FILM COATED ORAL
Refills: 0 | DISCHARGE

## 2025-05-13 RX ORDER — AMLODIPINE BESYLATE 10 MG/1
1 TABLET ORAL
Refills: 0 | DISCHARGE

## 2025-05-13 RX ORDER — ATORVASTATIN CALCIUM 80 MG/1
1 TABLET, FILM COATED ORAL
Refills: 0 | DISCHARGE

## 2025-05-14 ENCOUNTER — RX RENEWAL (OUTPATIENT)
Age: 35
End: 2025-05-14

## 2025-05-14 ENCOUNTER — NON-APPOINTMENT (OUTPATIENT)
Age: 35
End: 2025-05-14

## 2025-05-14 ENCOUNTER — APPOINTMENT (OUTPATIENT)
Dept: CARDIOLOGY | Facility: CLINIC | Age: 35
End: 2025-05-14
Payer: COMMERCIAL

## 2025-05-14 VITALS
HEIGHT: 74 IN | OXYGEN SATURATION: 97 % | BODY MASS INDEX: 33.24 KG/M2 | HEART RATE: 94 BPM | DIASTOLIC BLOOD PRESSURE: 88 MMHG | WEIGHT: 259 LBS | SYSTOLIC BLOOD PRESSURE: 134 MMHG

## 2025-05-14 DIAGNOSIS — Z71.3 DIETARY COUNSELING AND SURVEILLANCE: ICD-10-CM

## 2025-05-14 DIAGNOSIS — I10 ESSENTIAL (PRIMARY) HYPERTENSION: ICD-10-CM

## 2025-05-14 DIAGNOSIS — I42.9 CARDIOMYOPATHY, UNSPECIFIED: ICD-10-CM

## 2025-05-14 DIAGNOSIS — G47.33 OBSTRUCTIVE SLEEP APNEA (ADULT) (PEDIATRIC): ICD-10-CM

## 2025-05-14 DIAGNOSIS — I13.0 HYPERTENSIVE HEART AND CHRONIC KIDNEY DISEASE WITH HEART FAILURE AND STAGE 1 THROUGH STAGE 4 CHRONIC KIDNEY DISEASE, OR UNSPECIFIED CHRONIC KIDNEY DISEASE: ICD-10-CM

## 2025-05-14 DIAGNOSIS — I50.9 HEART FAILURE, UNSPECIFIED: ICD-10-CM

## 2025-05-14 DIAGNOSIS — I12.9 HYPERTENSIVE CHRONIC KIDNEY DISEASE WITH STAGE 1 THROUGH STAGE 4 CHRONIC KIDNEY DISEASE, OR UNSPECIFIED CHRONIC KIDNEY DISEASE: ICD-10-CM

## 2025-05-14 PROCEDURE — 99215 OFFICE O/P EST HI 40 MIN: CPT

## 2025-05-14 PROCEDURE — G2211 COMPLEX E/M VISIT ADD ON: CPT | Mod: NC

## 2025-05-14 RX ORDER — EPLERENONE 25 MG/1
25 TABLET, COATED ORAL DAILY
Qty: 30 | Refills: 4 | Status: ACTIVE | COMMUNITY
Start: 2025-05-14

## 2025-05-22 ENCOUNTER — LABORATORY RESULT (OUTPATIENT)
Age: 35
End: 2025-05-22

## 2025-05-22 PROBLEM — Z87.891 PERSONAL HISTORY OF NICOTINE DEPENDENCE: Chronic | Status: ACTIVE | Noted: 2025-05-07

## 2025-05-22 PROBLEM — Z86.79 PERSONAL HISTORY OF OTHER DISEASES OF THE CIRCULATORY SYSTEM: Chronic | Status: ACTIVE | Noted: 2025-05-07

## 2025-05-27 ENCOUNTER — APPOINTMENT (OUTPATIENT)
Dept: CARDIOLOGY | Facility: CLINIC | Age: 35
End: 2025-05-27
Payer: COMMERCIAL

## 2025-05-27 VITALS
HEART RATE: 81 BPM | DIASTOLIC BLOOD PRESSURE: 72 MMHG | SYSTOLIC BLOOD PRESSURE: 140 MMHG | WEIGHT: 258 LBS | OXYGEN SATURATION: 98 % | BODY MASS INDEX: 33.13 KG/M2

## 2025-05-27 VITALS — SYSTOLIC BLOOD PRESSURE: 132 MMHG | DIASTOLIC BLOOD PRESSURE: 86 MMHG

## 2025-05-27 DIAGNOSIS — I42.9 CARDIOMYOPATHY, UNSPECIFIED: ICD-10-CM

## 2025-05-27 DIAGNOSIS — I13.0 HYPERTENSIVE HEART AND CHRONIC KIDNEY DISEASE WITH HEART FAILURE AND STAGE 1 THROUGH STAGE 4 CHRONIC KIDNEY DISEASE, OR UNSPECIFIED CHRONIC KIDNEY DISEASE: ICD-10-CM

## 2025-05-27 DIAGNOSIS — G47.33 OBSTRUCTIVE SLEEP APNEA (ADULT) (PEDIATRIC): ICD-10-CM

## 2025-05-27 DIAGNOSIS — E66.812 OBESITY, CLASS 2: ICD-10-CM

## 2025-05-27 DIAGNOSIS — I50.9 HEART FAILURE, UNSPECIFIED: ICD-10-CM

## 2025-05-27 DIAGNOSIS — I10 ESSENTIAL (PRIMARY) HYPERTENSION: ICD-10-CM

## 2025-05-27 PROCEDURE — 99214 OFFICE O/P EST MOD 30 MIN: CPT

## 2025-05-27 RX ORDER — CARVEDILOL 6.25 MG/1
6.25 TABLET, FILM COATED ORAL TWICE DAILY
Qty: 180 | Refills: 1 | Status: ACTIVE | COMMUNITY
Start: 2025-05-27 | End: 1900-01-01

## 2025-06-24 ENCOUNTER — APPOINTMENT (OUTPATIENT)
Dept: CARDIOLOGY | Facility: CLINIC | Age: 35
End: 2025-06-24
Payer: COMMERCIAL

## 2025-06-24 VITALS
OXYGEN SATURATION: 98 % | DIASTOLIC BLOOD PRESSURE: 82 MMHG | BODY MASS INDEX: 33.9 KG/M2 | WEIGHT: 264 LBS | SYSTOLIC BLOOD PRESSURE: 132 MMHG | HEART RATE: 94 BPM

## 2025-06-24 PROCEDURE — 99214 OFFICE O/P EST MOD 30 MIN: CPT

## 2025-07-07 ENCOUNTER — RX RENEWAL (OUTPATIENT)
Age: 35
End: 2025-07-07

## 2025-07-09 ENCOUNTER — RX RENEWAL (OUTPATIENT)
Age: 35
End: 2025-07-09

## 2025-07-21 ENCOUNTER — APPOINTMENT (OUTPATIENT)
Dept: CARDIOLOGY | Facility: CLINIC | Age: 35
End: 2025-07-21
Payer: COMMERCIAL

## 2025-07-21 VITALS
BODY MASS INDEX: 34.15 KG/M2 | HEART RATE: 92 BPM | OXYGEN SATURATION: 96 % | WEIGHT: 266 LBS | DIASTOLIC BLOOD PRESSURE: 90 MMHG | SYSTOLIC BLOOD PRESSURE: 140 MMHG

## 2025-07-21 DIAGNOSIS — I10 ESSENTIAL (PRIMARY) HYPERTENSION: ICD-10-CM

## 2025-07-21 DIAGNOSIS — G47.33 OBSTRUCTIVE SLEEP APNEA (ADULT) (PEDIATRIC): ICD-10-CM

## 2025-07-21 DIAGNOSIS — I50.9 HEART FAILURE, UNSPECIFIED: ICD-10-CM

## 2025-07-21 DIAGNOSIS — E66.812 OBESITY, CLASS 2: ICD-10-CM

## 2025-07-21 PROCEDURE — 99214 OFFICE O/P EST MOD 30 MIN: CPT

## 2025-07-24 RX ORDER — TIRZEPATIDE 2.5 MG/.5ML
2.5 INJECTION, SOLUTION SUBCUTANEOUS
Qty: 1 | Refills: 0 | Status: ACTIVE | COMMUNITY
Start: 2025-07-21

## 2025-07-25 DIAGNOSIS — I12.9 HYPERTENSIVE CHRONIC KIDNEY DISEASE WITH STAGE 1 THROUGH STAGE 4 CHRONIC KIDNEY DISEASE, OR UNSPECIFIED CHRONIC KIDNEY DISEASE: ICD-10-CM

## 2025-07-25 DIAGNOSIS — N17.9 ACUTE KIDNEY FAILURE, UNSPECIFIED: ICD-10-CM

## 2025-07-25 DIAGNOSIS — I42.9 CARDIOMYOPATHY, UNSPECIFIED: ICD-10-CM

## 2025-07-25 DIAGNOSIS — R79.82 ELEVATED C-REACTIVE PROTEIN (CRP): ICD-10-CM

## 2025-08-04 ENCOUNTER — RX CHANGE (OUTPATIENT)
Age: 35
End: 2025-08-04

## 2025-08-06 ENCOUNTER — APPOINTMENT (OUTPATIENT)
Age: 35
End: 2025-08-06